# Patient Record
Sex: MALE | Race: WHITE | NOT HISPANIC OR LATINO | Employment: OTHER | URBAN - METROPOLITAN AREA
[De-identification: names, ages, dates, MRNs, and addresses within clinical notes are randomized per-mention and may not be internally consistent; named-entity substitution may affect disease eponyms.]

---

## 2017-03-14 ENCOUNTER — APPOINTMENT (OUTPATIENT)
Dept: CT IMAGING | Facility: HOSPITAL | Age: 62
End: 2017-03-14
Attending: EMERGENCY MEDICINE

## 2017-03-14 ENCOUNTER — APPOINTMENT (OUTPATIENT)
Dept: GENERAL RADIOLOGY | Facility: HOSPITAL | Age: 62
End: 2017-03-14

## 2017-03-14 ENCOUNTER — HOSPITAL ENCOUNTER (OUTPATIENT)
Facility: HOSPITAL | Age: 62
Discharge: HOME | End: 2017-03-15
Attending: EMERGENCY MEDICINE | Admitting: HOSPITALIST

## 2017-03-14 DIAGNOSIS — I16.1 HYPERTENSIVE EMERGENCY: ICD-10-CM

## 2017-03-14 DIAGNOSIS — I50.23 ACUTE ON CHRONIC SYSTOLIC CONGESTIVE HEART FAILURE (HCC): ICD-10-CM

## 2017-03-14 DIAGNOSIS — R10.12 LEFT UPPER QUADRANT PAIN: ICD-10-CM

## 2017-03-14 DIAGNOSIS — R07.9 CHEST PAIN, UNSPECIFIED TYPE: Primary | ICD-10-CM

## 2017-03-14 LAB
ALBUMIN SERPL-MCNC: 4 G/DL (ref 3.5–5.2)
ALBUMIN/GLOB SERPL: 1.1 G/DL
ALP SERPL-CCNC: 68 U/L (ref 40–129)
ALT SERPL W P-5'-P-CCNC: 17 U/L (ref 5–41)
ANION GAP SERPL CALCULATED.3IONS-SCNC: 13.2 MMOL/L
AST SERPL-CCNC: 14 U/L (ref 5–40)
BASOPHILS # BLD AUTO: 0.05 10*3/MM3 (ref 0–0.2)
BASOPHILS NFR BLD AUTO: 0.5 % (ref 0–2)
BILIRUB SERPL-MCNC: 0.4 MG/DL (ref 0.2–1.2)
BUN BLD-MCNC: 19 MG/DL (ref 8–23)
BUN/CREAT SERPL: 18.3 (ref 7–25)
CALCIUM SPEC-SCNC: 9.4 MG/DL (ref 8.8–10.5)
CHLORIDE SERPL-SCNC: 101 MMOL/L (ref 98–107)
CO2 SERPL-SCNC: 25.8 MMOL/L (ref 22–29)
CREAT BLD-MCNC: 1.04 MG/DL (ref 0.76–1.27)
D-LACTATE SERPL-SCNC: 1.1 MMOL/L (ref 0.5–2)
DEPRECATED RDW RBC AUTO: 53.8 FL (ref 37–54)
EOSINOPHIL # BLD AUTO: 0.1 10*3/MM3 (ref 0.1–0.3)
EOSINOPHIL NFR BLD AUTO: 1 % (ref 0–4)
ERYTHROCYTE [DISTWIDTH] IN BLOOD BY AUTOMATED COUNT: 17 % (ref 11.5–14.5)
GFR SERPL CREATININE-BSD FRML MDRD: 72 ML/MIN/1.73
GLOBULIN UR ELPH-MCNC: 3.6 GM/DL
GLUCOSE BLD-MCNC: 206 MG/DL (ref 65–99)
HCT VFR BLD AUTO: 41.7 % (ref 42–52)
HGB BLD-MCNC: 13.9 G/DL (ref 14–18)
IMM GRANULOCYTES # BLD: 0.03 10*3/MM3 (ref 0–0.03)
IMM GRANULOCYTES NFR BLD: 0.3 % (ref 0–0.5)
LIPASE SERPL-CCNC: 37 U/L (ref 13–60)
LYMPHOCYTES # BLD AUTO: 1.27 10*3/MM3 (ref 0.6–4.8)
LYMPHOCYTES NFR BLD AUTO: 12.1 % (ref 20–45)
MCH RBC QN AUTO: 28.8 PG (ref 27–31)
MCHC RBC AUTO-ENTMCNC: 33.3 G/DL (ref 31–37)
MCV RBC AUTO: 86.3 FL (ref 80–94)
MONOCYTES # BLD AUTO: 0.65 10*3/MM3 (ref 0–1)
MONOCYTES NFR BLD AUTO: 6.2 % (ref 3–8)
NEUTROPHILS # BLD AUTO: 8.36 10*3/MM3 (ref 1.5–8.3)
NEUTROPHILS NFR BLD AUTO: 79.9 % (ref 45–70)
NRBC BLD MANUAL-RTO: 0 /100 WBC (ref 0–0)
PLATELET # BLD AUTO: 237 10*3/MM3 (ref 140–500)
PMV BLD AUTO: 10.1 FL (ref 7.4–10.4)
POTASSIUM BLD-SCNC: 3.9 MMOL/L (ref 3.5–5.2)
PROT SERPL-MCNC: 7.6 G/DL (ref 6–8.5)
RBC # BLD AUTO: 4.83 10*6/MM3 (ref 4.7–6.1)
SODIUM BLD-SCNC: 140 MMOL/L (ref 136–145)
TROPONIN T SERPL-MCNC: <0.01 NG/ML (ref 0–0.03)
WBC NRBC COR # BLD: 10.46 10*3/MM3 (ref 4.8–10.8)

## 2017-03-14 PROCEDURE — 25010000002 HYDROMORPHONE PER 4 MG

## 2017-03-14 PROCEDURE — 96375 TX/PRO/DX INJ NEW DRUG ADDON: CPT

## 2017-03-14 PROCEDURE — 25010000002 HYDROMORPHONE PER 4 MG: Performed by: EMERGENCY MEDICINE

## 2017-03-14 PROCEDURE — 25010000002 ENOXAPARIN PER 10 MG: Performed by: HOSPITALIST

## 2017-03-14 PROCEDURE — 94660 CPAP INITIATION&MGMT: CPT

## 2017-03-14 PROCEDURE — 25010000002 ONDANSETRON PER 1 MG: Performed by: EMERGENCY MEDICINE

## 2017-03-14 PROCEDURE — 93005 ELECTROCARDIOGRAM TRACING: CPT | Performed by: EMERGENCY MEDICINE

## 2017-03-14 PROCEDURE — 25010000002 FUROSEMIDE PER 20 MG: Performed by: EMERGENCY MEDICINE

## 2017-03-14 PROCEDURE — 83690 ASSAY OF LIPASE: CPT | Performed by: EMERGENCY MEDICINE

## 2017-03-14 PROCEDURE — 96365 THER/PROPH/DIAG IV INF INIT: CPT

## 2017-03-14 PROCEDURE — 25010000002 FUROSEMIDE PER 20 MG: Performed by: INTERNAL MEDICINE

## 2017-03-14 PROCEDURE — 71010 HC CHEST PA OR AP: CPT

## 2017-03-14 PROCEDURE — 96376 TX/PRO/DX INJ SAME DRUG ADON: CPT

## 2017-03-14 PROCEDURE — 25010000002 MORPHINE SULFATE (PF) 2 MG/ML SOLUTION

## 2017-03-14 PROCEDURE — 84484 ASSAY OF TROPONIN QUANT: CPT | Performed by: HOSPITALIST

## 2017-03-14 PROCEDURE — 93010 ELECTROCARDIOGRAM REPORT: CPT | Performed by: INTERNAL MEDICINE

## 2017-03-14 PROCEDURE — 96361 HYDRATE IV INFUSION ADD-ON: CPT

## 2017-03-14 PROCEDURE — 93005 ELECTROCARDIOGRAM TRACING: CPT | Performed by: HOSPITALIST

## 2017-03-14 PROCEDURE — 80053 COMPREHEN METABOLIC PANEL: CPT | Performed by: EMERGENCY MEDICINE

## 2017-03-14 PROCEDURE — 83605 ASSAY OF LACTIC ACID: CPT | Performed by: EMERGENCY MEDICINE

## 2017-03-14 PROCEDURE — 96372 THER/PROPH/DIAG INJ SC/IM: CPT

## 2017-03-14 PROCEDURE — 99284 EMERGENCY DEPT VISIT MOD MDM: CPT | Performed by: EMERGENCY MEDICINE

## 2017-03-14 PROCEDURE — 85025 COMPLETE CBC W/AUTO DIFF WBC: CPT | Performed by: EMERGENCY MEDICINE

## 2017-03-14 PROCEDURE — 99284 EMERGENCY DEPT VISIT MOD MDM: CPT

## 2017-03-14 PROCEDURE — 99222 1ST HOSP IP/OBS MODERATE 55: CPT | Performed by: INTERNAL MEDICINE

## 2017-03-14 PROCEDURE — 94640 AIRWAY INHALATION TREATMENT: CPT

## 2017-03-14 PROCEDURE — 84484 ASSAY OF TROPONIN QUANT: CPT | Performed by: EMERGENCY MEDICINE

## 2017-03-14 PROCEDURE — 0 IOPAMIDOL PER 1 ML: Performed by: EMERGENCY MEDICINE

## 2017-03-14 PROCEDURE — 96374 THER/PROPH/DIAG INJ IV PUSH: CPT

## 2017-03-14 PROCEDURE — 74177 CT ABD & PELVIS W/CONTRAST: CPT

## 2017-03-14 RX ORDER — IPRATROPIUM BROMIDE AND ALBUTEROL SULFATE 2.5; .5 MG/3ML; MG/3ML
3 SOLUTION RESPIRATORY (INHALATION) EVERY 4 HOURS PRN
Status: DISCONTINUED | OUTPATIENT
Start: 2017-03-14 | End: 2017-03-15 | Stop reason: HOSPADM

## 2017-03-14 RX ORDER — MAGNESIUM SULFATE HEPTAHYDRATE 40 MG/ML
4 INJECTION, SOLUTION INTRAVENOUS ONCE
Status: COMPLETED | OUTPATIENT
Start: 2017-03-14 | End: 2017-03-14

## 2017-03-14 RX ORDER — SPIRONOLACTONE 25 MG/1
12.5 TABLET ORAL DAILY
Status: DISCONTINUED | OUTPATIENT
Start: 2017-03-14 | End: 2017-03-15 | Stop reason: HOSPADM

## 2017-03-14 RX ORDER — ISOSORBIDE MONONITRATE 60 MG/1
60 TABLET, EXTENDED RELEASE ORAL DAILY
Status: DISCONTINUED | OUTPATIENT
Start: 2017-03-14 | End: 2017-03-15 | Stop reason: HOSPADM

## 2017-03-14 RX ORDER — FUROSEMIDE 10 MG/ML
40 INJECTION INTRAMUSCULAR; INTRAVENOUS ONCE
Status: COMPLETED | OUTPATIENT
Start: 2017-03-14 | End: 2017-03-14

## 2017-03-14 RX ORDER — NITROGLYCERIN 0.4 MG/1
0.4 TABLET SUBLINGUAL
Status: DISCONTINUED | OUTPATIENT
Start: 2017-03-14 | End: 2017-03-15 | Stop reason: HOSPADM

## 2017-03-14 RX ORDER — ROPINIROLE 1 MG/1
4 TABLET, FILM COATED ORAL NIGHTLY
Status: DISCONTINUED | OUTPATIENT
Start: 2017-03-14 | End: 2017-03-15 | Stop reason: HOSPADM

## 2017-03-14 RX ORDER — NITROGLYCERIN 0.4 MG/1
0.4 TABLET SUBLINGUAL
Status: COMPLETED | OUTPATIENT
Start: 2017-03-14 | End: 2017-03-14

## 2017-03-14 RX ORDER — AMLODIPINE BESYLATE 5 MG/1
5 TABLET ORAL
Status: DISCONTINUED | OUTPATIENT
Start: 2017-03-14 | End: 2017-03-14

## 2017-03-14 RX ORDER — SODIUM CHLORIDE 0.9 % (FLUSH) 0.9 %
10 SYRINGE (ML) INJECTION AS NEEDED
Status: DISCONTINUED | OUTPATIENT
Start: 2017-03-14 | End: 2017-03-15 | Stop reason: HOSPADM

## 2017-03-14 RX ORDER — PANTOPRAZOLE SODIUM 40 MG/10ML
40 INJECTION, POWDER, LYOPHILIZED, FOR SOLUTION INTRAVENOUS EVERY 12 HOURS SCHEDULED
Status: DISCONTINUED | OUTPATIENT
Start: 2017-03-14 | End: 2017-03-15 | Stop reason: HOSPADM

## 2017-03-14 RX ORDER — METOPROLOL SUCCINATE 50 MG/1
50 TABLET, EXTENDED RELEASE ORAL DAILY
COMMUNITY
End: 2017-05-23

## 2017-03-14 RX ORDER — MORPHINE SULFATE 2 MG/ML
INJECTION, SOLUTION INTRAMUSCULAR; INTRAVENOUS
Status: COMPLETED
Start: 2017-03-14 | End: 2017-03-14

## 2017-03-14 RX ORDER — SODIUM CHLORIDE 0.9 % (FLUSH) 0.9 %
1-10 SYRINGE (ML) INJECTION AS NEEDED
Status: DISCONTINUED | OUTPATIENT
Start: 2017-03-14 | End: 2017-03-15 | Stop reason: HOSPADM

## 2017-03-14 RX ORDER — ONDANSETRON 2 MG/ML
4 INJECTION INTRAMUSCULAR; INTRAVENOUS ONCE
Status: COMPLETED | OUTPATIENT
Start: 2017-03-14 | End: 2017-03-14

## 2017-03-14 RX ORDER — FUROSEMIDE 40 MG/1
40 TABLET ORAL DAILY
Status: DISCONTINUED | OUTPATIENT
Start: 2017-03-15 | End: 2017-03-15 | Stop reason: HOSPADM

## 2017-03-14 RX ORDER — OXYCODONE HYDROCHLORIDE AND ACETAMINOPHEN 5; 325 MG/1; MG/1
1 TABLET ORAL ONCE
Status: COMPLETED | OUTPATIENT
Start: 2017-03-14 | End: 2017-03-14

## 2017-03-14 RX ORDER — GLIPIZIDE 10 MG/1
10 TABLET ORAL 2 TIMES DAILY
COMMUNITY
End: 2017-03-15 | Stop reason: HOSPADM

## 2017-03-14 RX ORDER — NITROGLYCERIN 20 MG/100ML
10-50 INJECTION INTRAVENOUS
Status: DISCONTINUED | OUTPATIENT
Start: 2017-03-14 | End: 2017-03-15 | Stop reason: HOSPADM

## 2017-03-14 RX ORDER — FUROSEMIDE 40 MG/1
40 TABLET ORAL DAILY
Status: DISCONTINUED | OUTPATIENT
Start: 2017-03-14 | End: 2017-03-14

## 2017-03-14 RX ORDER — BUDESONIDE AND FORMOTEROL FUMARATE DIHYDRATE 160; 4.5 UG/1; UG/1
2 AEROSOL RESPIRATORY (INHALATION)
COMMUNITY
End: 2017-03-15 | Stop reason: HOSPADM

## 2017-03-14 RX ORDER — NITROGLYCERIN 0.4 MG/1
0.4 TABLET SUBLINGUAL
COMMUNITY

## 2017-03-14 RX ORDER — AMLODIPINE BESYLATE 5 MG/1
5 TABLET ORAL
Status: DISCONTINUED | OUTPATIENT
Start: 2017-03-14 | End: 2017-03-15 | Stop reason: HOSPADM

## 2017-03-14 RX ORDER — ASPIRIN 325 MG
325 TABLET ORAL ONCE
Status: COMPLETED | OUTPATIENT
Start: 2017-03-14 | End: 2017-03-14

## 2017-03-14 RX ORDER — OXYCODONE HYDROCHLORIDE AND ACETAMINOPHEN 5; 325 MG/1; MG/1
TABLET ORAL
Status: DISPENSED
Start: 2017-03-14 | End: 2017-03-15

## 2017-03-14 RX ORDER — ALBUTEROL SULFATE 90 UG/1
2 AEROSOL, METERED RESPIRATORY (INHALATION) EVERY 4 HOURS PRN
COMMUNITY
End: 2017-03-15 | Stop reason: HOSPADM

## 2017-03-14 RX ORDER — BUDESONIDE AND FORMOTEROL FUMARATE DIHYDRATE 160; 4.5 UG/1; UG/1
2 AEROSOL RESPIRATORY (INHALATION)
Status: DISCONTINUED | OUTPATIENT
Start: 2017-03-14 | End: 2017-03-15 | Stop reason: HOSPADM

## 2017-03-14 RX ORDER — ALBUTEROL SULFATE 2.5 MG/3ML
2.5 SOLUTION RESPIRATORY (INHALATION) EVERY 4 HOURS PRN
Status: DISCONTINUED | OUTPATIENT
Start: 2017-03-14 | End: 2017-03-15 | Stop reason: HOSPADM

## 2017-03-14 RX ORDER — ISOSORBIDE MONONITRATE 60 MG/1
60 TABLET, EXTENDED RELEASE ORAL DAILY
COMMUNITY

## 2017-03-14 RX ORDER — MORPHINE SULFATE 2 MG/ML
2 INJECTION, SOLUTION INTRAMUSCULAR; INTRAVENOUS EVERY 6 HOURS PRN
Status: DISCONTINUED | OUTPATIENT
Start: 2017-03-14 | End: 2017-03-15 | Stop reason: HOSPADM

## 2017-03-14 RX ORDER — OXYCODONE AND ACETAMINOPHEN 10; 325 MG/1; MG/1
1 TABLET ORAL EVERY 4 HOURS PRN
Status: DISCONTINUED | OUTPATIENT
Start: 2017-03-14 | End: 2017-03-15 | Stop reason: HOSPADM

## 2017-03-14 RX ORDER — GLIPIZIDE 10 MG/1
10 TABLET ORAL 2 TIMES DAILY
Status: DISCONTINUED | OUTPATIENT
Start: 2017-03-14 | End: 2017-03-15 | Stop reason: HOSPADM

## 2017-03-14 RX ORDER — DILTIAZEM HYDROCHLORIDE 5 MG/ML
15 INJECTION INTRAVENOUS ONCE
Status: COMPLETED | OUTPATIENT
Start: 2017-03-14 | End: 2017-03-14

## 2017-03-14 RX ORDER — METOPROLOL SUCCINATE 50 MG/1
50 TABLET, EXTENDED RELEASE ORAL DAILY
Status: DISCONTINUED | OUTPATIENT
Start: 2017-03-14 | End: 2017-03-15 | Stop reason: HOSPADM

## 2017-03-14 RX ORDER — FUROSEMIDE 10 MG/ML
40 INJECTION INTRAMUSCULAR; INTRAVENOUS 2 TIMES DAILY
Status: DISCONTINUED | OUTPATIENT
Start: 2017-03-14 | End: 2017-03-14

## 2017-03-14 RX ORDER — ASPIRIN 81 MG/1
81 TABLET ORAL ONCE
Status: COMPLETED | OUTPATIENT
Start: 2017-03-14 | End: 2017-03-14

## 2017-03-14 RX ADMIN — NITROGLYCERIN 0.4 MG: 0.4 TABLET SUBLINGUAL at 11:38

## 2017-03-14 RX ADMIN — METOPROLOL SUCCINATE 50 MG: 50 TABLET, EXTENDED RELEASE ORAL at 17:52

## 2017-03-14 RX ADMIN — ENOXAPARIN SODIUM 40 MG: 40 INJECTION SUBCUTANEOUS at 17:52

## 2017-03-14 RX ADMIN — SPIRONOLACTONE 12.5 MG: 25 TABLET ORAL at 17:55

## 2017-03-14 RX ADMIN — NITROGLYCERIN 0.4 MG: 0.4 TABLET SUBLINGUAL at 11:47

## 2017-03-14 RX ADMIN — BUDESONIDE AND FORMOTEROL FUMARATE DIHYDRATE 2 PUFF: 160; 4.5 AEROSOL RESPIRATORY (INHALATION) at 19:44

## 2017-03-14 RX ADMIN — MORPHINE SULFATE 2 MG: 2 INJECTION, SOLUTION INTRAMUSCULAR; INTRAVENOUS at 22:23

## 2017-03-14 RX ADMIN — ASPIRIN 81 MG: 81 TABLET, COATED ORAL at 17:52

## 2017-03-14 RX ADMIN — HYDROMORPHONE HYDROCHLORIDE 0.5 MG: 1 INJECTION, SOLUTION INTRAMUSCULAR; INTRAVENOUS; SUBCUTANEOUS at 23:27

## 2017-03-14 RX ADMIN — MAGNESIUM SULFATE HEPTAHYDRATE 4 G: 40 INJECTION, SOLUTION INTRAVENOUS at 18:40

## 2017-03-14 RX ADMIN — OXYCODONE HYDROCHLORIDE AND ACETAMINOPHEN 1 TABLET: 5; 325 TABLET ORAL at 14:19

## 2017-03-14 RX ADMIN — ROPINIROLE HYDROCHLORIDE 4 MG: 1 TABLET, FILM COATED ORAL at 20:05

## 2017-03-14 RX ADMIN — SODIUM CHLORIDE 1000 ML: 9 INJECTION, SOLUTION INTRAVENOUS at 09:40

## 2017-03-14 RX ADMIN — OXYCODONE HYDROCHLORIDE AND ACETAMINOPHEN 1 TABLET: 10; 325 TABLET ORAL at 22:03

## 2017-03-14 RX ADMIN — ASPIRIN 325 MG: 325 TABLET, COATED ORAL at 11:31

## 2017-03-14 RX ADMIN — ISOSORBIDE MONONITRATE 60 MG: 60 TABLET, EXTENDED RELEASE ORAL at 17:52

## 2017-03-14 RX ADMIN — AMLODIPINE BESYLATE 5 MG: 5 TABLET ORAL at 18:40

## 2017-03-14 RX ADMIN — NITROGLYCERIN 0.4 MG: 0.4 TABLET SUBLINGUAL at 11:32

## 2017-03-14 RX ADMIN — OXYCODONE HYDROCHLORIDE AND ACETAMINOPHEN 1 TABLET: 10; 325 TABLET ORAL at 17:52

## 2017-03-14 RX ADMIN — IOPAMIDOL 100 ML: 755 INJECTION, SOLUTION INTRAVENOUS at 10:51

## 2017-03-14 RX ADMIN — ONDANSETRON 4 MG: 2 INJECTION, SOLUTION INTRAMUSCULAR; INTRAVENOUS at 09:39

## 2017-03-14 RX ADMIN — GLIPIZIDE 10 MG: 10 TABLET ORAL at 17:52

## 2017-03-14 RX ADMIN — OXYCODONE AND ACETAMINOPHEN 1 TABLET: 5; 325 TABLET ORAL at 13:47

## 2017-03-14 RX ADMIN — FUROSEMIDE 40 MG: 10 INJECTION, SOLUTION INTRAMUSCULAR; INTRAVENOUS at 18:32

## 2017-03-14 RX ADMIN — PANTOPRAZOLE SODIUM 40 MG: 40 INJECTION, POWDER, FOR SOLUTION INTRAVENOUS at 22:23

## 2017-03-14 RX ADMIN — HYDROMORPHONE HYDROCHLORIDE 1 MG: 1 INJECTION, SOLUTION INTRAMUSCULAR; INTRAVENOUS; SUBCUTANEOUS at 09:39

## 2017-03-14 RX ADMIN — DILTIAZEM HYDROCHLORIDE 15 MG: 5 INJECTION INTRAVENOUS at 11:21

## 2017-03-14 RX ADMIN — FUROSEMIDE 40 MG: 40 TABLET ORAL at 17:52

## 2017-03-14 RX ADMIN — FUROSEMIDE 40 MG: 10 INJECTION, SOLUTION INTRAMUSCULAR; INTRAVENOUS at 12:05

## 2017-03-14 NOTE — ED NOTES
"Pt tolerating BIPAP well.  Pt sts \"I wish I had one of these at home.\"     Yoselyn Peoples RN  03/14/17 1210    "

## 2017-03-14 NOTE — ED NOTES
Spoke with dyana NARANJO at [facility] she would page dr lucas.     Srinivasan Baron  03/14/17 4598

## 2017-03-14 NOTE — NURSING NOTE
Pt insisted on walking in the [place], seemed agitated and irritable.  Observed pt to ambulate with steady gate.   Arranged for tele box and asked pt to stay in view of nurses station.  Pt agreed, reports no pain at this time apart from his chronic back pain pt reports to be exacerbated by lying in bed.

## 2017-03-14 NOTE — ED NOTES
Dr Harper at bedside to eval pt.  Aware of lung sounds, diaphoresis.       Yoselyn Peoples, OSMANY  03/14/17 4310

## 2017-03-14 NOTE — ED NOTES
Pt still upset that he has not rec'd 10mg percocet.  Pt does not want to stay if he doesn't get his 10mg percocet.  Dr Harper aware.  Another 5mg ordered and given.     Yoselyn Peoples RN  03/14/17 9839

## 2017-03-14 NOTE — H&P
Hospitalist Team      Patient Care Team:  SERGEY Cornejo as PCP - General (Family Medicine)    CHIEF COMPLAINT: CP/SOA  HISTORY OF PRESENT ILLNESS:  Per ED ote:  MDM  62 yo M presents with 2 hours of epigastric and LUQ abdominal pain, hx of pancreatitis and reports this pain is similar. IV established, given IV dilaudid and zofran, NS bolus. Labs including lipase, lactate obtained. CT abdomen w/ IV contrast ordered. Initial labs reassuring, with normal lipase, normal troponin. He had initial symptom improvement, however later complained of chest pain, became very diaphoretic and hypertensive, and dyspneic, with crackles noted at lung bases bilaterally. He was given nitro SL with reduction of his BP, placed on BIPAP due to concern flash pulmonary edema, and given lasix 40 IV, . Serial EKGs obtained. He has baseline LBBB with rate dependent lateral ST depressions, no ST elevation or Sgarbossa criteria for STEMI. His pain resolved with BP reduction, and work of breathing improved greatly, trialing off BIPAP at this time. Will require admission for serial troponins, monitoring of blood pressure, diuresis. Later review of his chart reveals EF 25% on prior ECHO.    He is accepted to the ICU by Dr. Hill after 3 hour troponin returned normal.       On my interview pt reaffirms the above. Admitted to ICU doing better CP free. Off bipap on 2L NC, eating at bedside. No wheezes, no f/c/s/n/v/d. abd pain improved.           Past Medical History   Diagnosis Date   • Arthritis    • Chest pain    • COPD (chronic obstructive pulmonary disease)    • Diabetes    • Heart attack    • Heart disease    • HTN (hypertension)    • Hyperlipidemia    • Hypertension    • Pancreatitis    • Sleep apnea      Past Surgical History   Procedure Laterality Date   • Coronary artery bypass graft     • Coronary angioplasty with stent placement     • Incision and drainage abscess     • Head/neck abscess incision and drainage N/A  3/17/2016     Procedure: HEAD NECK ABSCESS INCISION AND DRAINAGE-POSTERIOR NECK with cultures;  Surgeon: Liz Hernandez DO;  Location: [facility] OR;  Service:      Family History   Problem Relation Age of Onset   • Diabetes Mother    • Heart disease Mother      Social History   Substance Use Topics   • Smoking status: Current Every Day Smoker     Packs/day: 1.00     Years: 50.00   • Smokeless tobacco: None   • Alcohol use No     Prescriptions Prior to Admission   Medication Sig Dispense Refill Last Dose   • albuterol (PROVENTIL HFA) 108 (90 BASE) MCG/ACT inhaler Inhale 2 puffs Every 4 (Four) Hours As Needed for Wheezing.   3/13/2017 at Unknown time   • albuterol (PROVENTIL) (2.5 MG/3ML) 0.083% nebulizer solution Take 2.5 mg by nebulization every 4 (four) hours as needed for wheezing. 25 vial 0 3/13/2017 at Unknown time   • aspirin 81 MG tablet Take 81 mg by mouth Daily.   3/13/2017 at Unknown time   • aspirin 81 MG tablet Take 81 mg by mouth Daily.   3/13/2017 at Unknown time   • budesonide-formoterol (SYMBICORT) 160-4.5 MCG/ACT inhaler Symbicort 160-4.5 MCG/ACT Inhalation Aerosol; Patient Sig: Symbicort 160-4.5 MCG/ACT Inhalation Aerosol ; 0; 08-Apr-2015; Active   3/13/2017 at Unknown time   • budesonide-formoterol (SYMBICORT) 160-4.5 MCG/ACT inhaler Inhale 2 puffs 2 (Two) Times a Day. PRN wheezing   3/13/2017 at Unknown time   • furosemide (LASIX) 40 MG tablet Take 40 mg by mouth Daily.   3/13/2017 at Unknown time   • glipiZIDE (GLUCOTROL) 10 MG tablet Take 10 mg by mouth 2 (Two) Times a Day Before Meals.   3/13/2017 at Unknown time   • glipiZIDE (GLUCOTROL) 10 MG tablet Take 10 mg by mouth 2 (Two) Times a Day.   3/13/2017 at Unknown time   • ibuprofen (ADVIL,MOTRIN) 800 MG tablet Take 1 tablet by mouth every 8 (eight) hours as needed for moderate pain (4-6) for up to 60 doses. 60 tablet 2 Past Week at Unknown time   • isosorbide mononitrate (IMDUR) 60 MG 24 hr tablet Take 60 mg by mouth Daily.   3/13/2017 at  Unknown time   • metFORMIN (GLUCOPHAGE) 1000 MG tablet Take 1,000 mg by mouth 2 (Two) Times a Day With Meals.   3/13/2017 at Unknown time   • metFORMIN (GLUCOPHAGE) 1000 MG tablet Take 1,000 mg by mouth 2 (Two) Times a Day.   3/13/2017 at Unknown time   • metoprolol succinate XL (TOPROL-XL) 50 MG 24 hr tablet Take 50 mg by mouth Daily.   3/13/2017 at Unknown time   • nitroglycerin (NITROSTAT) 0.4 MG SL tablet Place 0.4 mg under the tongue Every 5 (Five) Minutes As Needed for chest pain. Take no more than 3 doses in 15 minutes.   3/13/2017 at Unknown time   • oxyCODONE-acetaminophen (PERCOCET)  MG per tablet Take 1 tablet by mouth Every 4 (Four) Hours As Needed for Moderate Pain (4-6).   3/14/2017 at Unknown time   • rOPINIRole (REQUIP) 2 MG tablet Take 4 mg by mouth Every Night.   3/13/2017 at Unknown time   • Sacubitril-Valsartan (ENTRESTO PO) Take  by mouth 2 (Two) Times a Day. Pt reports he receives his med in sample form from his cardiologist and does not know the dosage but takes twice a day.   3/13/2017 at Unknown time   • spironolactone (ALDACTONE) 25 MG tablet Take 12.5 mg by mouth Daily.   3/13/2017 at Unknown time   • albuterol (PROVENTIL HFA;VENTOLIN HFA) 108 (90 BASE) MCG/ACT inhaler Ventolin  (90 Base) MCG/ACT Inhalation Aerosol Solution; Patient Sig: Ventolin  (90 Base) MCG/ACT Inhalation Aerosol Solution ; 0; 08-Apr-2015; Active   3/16/2016 at 1000   • gabapentin (NEURONTIN) 600 MG tablet Take 600 mg by mouth 3 (three) times a day.   3/16/2016 at 1000   • MethylPREDNISolone (MEDROL) 4 MG tablet follow package directions 21 tablet 0    • nitroglycerin (NITROSTAT) 0.4 MG SL tablet Place  under the tongue.   Unknown at Unknown time   • potassium chloride (MICRO-K) 10 MEQ CR capsule Take  by mouth.   3/16/2016 at 1000   • prasugrel (EFFIENT) tablet Take  by mouth daily.   3/16/2016 at 1000     Allergies:  Review of patient's allergies indicates no known allergies.    REVIEW OF SYSTEMS:  " Please see the above history of present illness for pertinent positives and negatives.  The remainder of the patient's systems have been reviewed and are negative x14 systems.    Vital Signs  Temp:  [98.2 °F (36.8 °C)] 98.2 °F (36.8 °C)  Heart Rate:  [] 82  Resp:  [12-28] 12  BP: (110-212)/() 168/97    Flowsheet Rows         First Filed Value    Admission Height  68\" (172.7 cm) Documented at 03/14/2017 0927    Admission Weight  177 lb 7 oz (80.5 kg) Documented at 03/14/2017 0927           Physical Exam:  Physical Exam   Constitutional: Patient appears well-developed and well-nourished and in no acute distress, eating at bedside   HEENT:   Head: Normocephalic and atraumatic.   Eyes:  Pupils are equal, round, and reactive to light. EOM are intact. Sclera are anicteric and non-injected.  Mouth and Throat: Patient has moist mucous membranes. Oropharynx is clear of any erythema or exudate.     Neck: Neck supple. + JVD present. No thyromegaly present. No lymphadenopathy present.  Cardiovascular: Regular rate, regular rhythm, S1 normal and S2 normal.  2/6 LADONNA. Exam reveals no gallop and no friction rub.   Pulmonary/Chest: Lungs are with L>R crackles basally No respiratory distress. No wheezes. No rhonchi..   Abdominal: Soft. Bowel sounds are normal. No distension and no mass. There is no hepatosplenomegaly. There is no tenderness.   Musculoskeletal: Normal Muscle tone  Extremities: trace edema. Pulses are palpable in all 4 extremities.  Neurological: Patient is alert and oriented to person, place, and time. Cranial nerves II-XII are grossly intact with no focal deficits.  Skin: Skin is warm. No rash noted. Nails show no clubbing.  No cyanosis or erythema.     Results Review:    I reviewed the patient's new clinical results.  Lab Results (most recent)     Procedure Component Value Units Date/Time    CBC & Differential [[id number]] Collected:  03/14/17 0933    Specimen:  Blood Updated:  03/14/17 0954    Narrative: "       The following orders were created for panel order CBC & Differential.  Procedure                               Abnormality         Status                     ---------                               -----------         ------                     CBC Auto Differential[[id number]]         Abnormal            Final result                 Please view results for these tests on the individual orders.    CBC Auto Differential [[id number]]  (Abnormal) Collected:  03/14/17 0933    Specimen:  Blood Updated:  03/14/17 0954     WBC 10.46 10*3/mm3      RBC 4.83 10*6/mm3      Hemoglobin 13.9 (L) g/dL      Hematocrit 41.7 (L) %      MCV 86.3 fL      MCH 28.8 pg      MCHC 33.3 g/dL      RDW 17.0 (H) %      RDW-SD 53.8 fl      MPV 10.1 fL      Platelets 237 10*3/mm3      Neutrophil % 79.9 (H) %      Lymphocyte % 12.1 (L) %      Monocyte % 6.2 %      Eosinophil % 1.0 %      Basophil % 0.5 %      Immature Grans % 0.3 %      Neutrophils, Absolute 8.36 (H) 10*3/mm3      Lymphocytes, Absolute 1.27 10*3/mm3      Monocytes, Absolute 0.65 10*3/mm3      Eosinophils, Absolute 0.10 10*3/mm3      Basophils, Absolute 0.05 10*3/mm3      Immature Grans, Absolute 0.03 10*3/mm3      nRBC 0.0 /100 WBC     Lactic Acid, Plasma [[id number]]  (Normal) Collected:  03/14/17 0933    Specimen:  Blood Updated:  03/14/17 1007     Lactate 1.1 mmol/L     Lipase [[id number]]  (Normal) Collected:  03/14/17 0933    Specimen:  Blood Updated:  03/14/17 1007     Lipase 37 U/L     Comprehensive Metabolic Panel [[id number]]  (Abnormal) Collected:  03/14/17 0933    Specimen:  Blood Updated:  03/14/17 1013     Glucose 206 (H) mg/dL      BUN 19 mg/dL      Creatinine 1.04 mg/dL      Sodium 140 mmol/L      Potassium 3.9 mmol/L      Chloride 101 mmol/L      CO2 25.8 mmol/L      Calcium 9.4 mg/dL      Total Protein 7.6 g/dL      Albumin 4.00 g/dL      ALT (SGPT) 17 U/L      AST (SGOT) 14 U/L       Specimen hemolyzed.  Results may be affected.        Alkaline Phosphatase 68 U/L       Total Bilirubin 0.4 mg/dL      eGFR Non African Amer 72 mL/min/1.73      Globulin 3.6 gm/dL      A/G Ratio 1.1 g/dL      BUN/Creatinine Ratio 18.3      Anion Gap 13.2 mmol/L     Troponin [[id number]]  (Normal) Collected:  03/14/17 0933    Specimen:  Blood Updated:  03/14/17 1137     Troponin T <0.010 ng/mL     Narrative:       Troponin T Reference Ranges:  Less than 0.03 ng/mL:    Negative for AMI  0.03 to 0.09 ng/mL:      Indeterminant for AMI  Greater than 0.09 ng/mL: Positive for AMI    Troponin [[id number]]  (Normal) Collected:  03/14/17 1227    Specimen:  Blood from Hand, Left Updated:  03/14/17 1313     Troponin T <0.010 ng/mL     Narrative:       Troponin T Reference Ranges:  Less than 0.03 ng/mL:    Negative for AMI  0.03 to 0.09 ng/mL:      Indeterminant for AMI  Greater than 0.09 ng/mL: Positive for AMI          Imaging Results (most recent)     Procedure Component Value Units Date/Time    CT Abdomen Pelvis With Contrast [[id number]] Collected:  03/14/17 1104     Updated:  03/14/17 1113    Narrative:       CT ABDOMEN AND PELVIS WITH CONTRAST, 03/14/2017:     HISTORY:  60-year-old male in the ED complaining of epigastric pain and left upper  quadrant abdomen pain with nausea beginning earlier this morning. The  patient notes a history of intermittent pancreatitis over the prior five  years. Lipase levels are currently normal.     TECHNIQUE:  CT examination of the abdomen and pelvis with IV contrast. GI contrast  was not ordered. Radiation dose reduction techniques were utilized,  including automated exposure control and exposure modulation based on  body size.     LOWER CHEST FINDINGS:  Lung base images show mild interstitial edema and small bilateral  pleural effusions suggesting vascular congestion. Clinical correlation  recommended. No pericardial effusion.     ABDOMEN FINDINGS:  Diffuse chronic calcific pancreatitis, but no evidence of acute  pancreatitis at this time. Nondistended gallbladder. No bile duct  or  pancreatic duct dilatation.  Liver and spleen are normal in size and appearance.     There is a small subacute or chronic infarct in the lower pole left  kidney, not present on a CTA examination of 08/09/2013. Nonobstructing 7  mm left mid renal calculus. Small right lower renal cyst. No evidence of  urinary obstruction. Normal caliber abdominal aorta.     Mild sigmoid diverticulosis. Small bowel and colon are otherwise normal  in caliber and appearance, as imaged. The appendix is normal.     PELVIS FINDINGS:  Urinary bladder, prostate and rectum are within normal limits.       Impression:       1. Lung base images showing vascular congestion with mild interstitial  edema and small bilateral pleural effusions.  2. Small subacute or chronic left lower pole renal infarct.  3. Chronic calcific pancreatitis. No CT evidence of acute pancreatitis  at this time.  4. Mild diverticulosis. Normal appendix.  5. Nonobstructing left mid renal calculus.     This report was finalized on 3/14/2017 11:11 AM by Dr. Andrade Rodriguez MD.       XR Chest 1 View [[id number]] Collected:  03/14/17 1224     Updated:  03/14/17 1228    Narrative:       CHEST X-RAY, 03/14/2017:     HISTORY:   61-year-old male in the ED complaining of read a history of chest pain  and shortness of air.     TECHNIQUE:  AP portable upright chest x-ray.     FINDINGS:  Mild cardiomegaly is stable. Pulmonary venous redistribution, mild  diffuse interstitial edema and tiny bilateral pleural effusions  suggesting mild vascular congestion. This is similar when compared with  the previous study of 07/14/2016. Prior CABG. Cardiac pacer/ICD.       Impression:       1. Mild vascular congestion with diffuse interstitial edema and tiny  pleural effusions. Similar findings were present on 07/14/2016.  2. Stable mild cardiomegaly. CABG.     This report was finalized on 3/14/2017 12:26 PM by Dr. Andrade Rodriguez MD.           reviewed    ECG/EMG Results (most recent)      Procedure Component Value Units Date/Time    ECG 12 Lead [[id number]] Collected:  03/14/17 1114     Updated:  03/14/17 1425    Narrative:       RR Interval= 619 ms  LA Interval= ms  QRSD Interval= 172 ms  QT Interval= ms  QTc Interval= 0 ms  Heart Rate= 97 ms  P Axis= deg  QRS Axis= 79 deg  T Wave Axis= deg  I: 40 Axis= 87 deg  T: 40 Axis= 145 deg  ST Axis= deg  VENTRICULAR PREMATURE COMPLEX  NONSPECIFIC INTRAVENTRICULAR CONDUCTION DELAY  PROBABLE LVH WITH SECONDARY REPOL ABNRM  SINUS RHYTHM  NO SIGNIFICANT CHANGE FROM PREVIOUS ECG  Electronically Signed by:  Kathryn Gonzales ([facility]) 14-Mar-2017 14:23:05  Date and Time of Study: 2017-03-14 11:14:12    ECG 12 Lead [[id number]] Collected:  03/14/17 0945     Updated:  03/14/17 1426    Narrative:       RR Interval= 571 ms  LA Interval= 148 ms  QRSD Interval= 144 ms  QT Interval= 384 ms  QTc Interval= 508 ms  Heart Rate= 105 ms  P Axis= 52 deg  QRS Axis= 63 deg  T Wave Axis= 221 deg  I: 40 Axis= 92 deg  T: 40 Axis= deg  ST Axis= 212 deg  SINUS TACHYCARDIA  VENTRICULAR PREMATURE COMPLEX  PROBABLE LEFT ATRIAL ABNORMALITY  LEFT BUNDLE BRANCH BLOCK  NO SIGNIFICANT CHANGE FROM PREVIOUS ECG  Electronically Signed by:  Kathryn Gonzales ([facility]) 14-Mar-2017 14:22:33  Date and Time of Study: 2017-03-14 09:45:55    ECG 12 Lead [[id number]] Collected:  03/14/17 1143     Updated:  03/14/17 1427    Narrative:       RR Interval= 496 ms  LA Interval= ms  QRSD Interval= 148 ms  QT Interval= 384 ms  QTc Interval= 545 ms  Heart Rate= 121 ms  P Axis= 0 deg  QRS Axis= 62 deg  T Wave Axis= 208 deg  I: 40 Axis= 85 deg  T: 40 Axis= 70 deg  ST Axis= 179 deg  SINUS TACHYCARDIA  LEFT BUNDLE BRANCH BLOCK  NO SIGNIFICANT CHANGE FROM PREVIOUS ECG  Electronically Signed by:  Kathryn Gonzales ([facility]) 14-Mar-2017 14:23:16  Date and Time of Study: 2017-03-14 11:43:02        reviewed    Assessment/Plan   1. CP: likely cardiac cp 2/2 severe HTN with flash pulm edema, completely resolved with BP reduction and  SOA/hypoxia greatly improved with diuresis. No ICD shocks but no way to interrogate here now.  - another IV dose lasix tonight then change dose back to PO home dose,   - cont metop XL daily,   - on entresto at home so will have pt family bring that here for him, no ACEi/ARB can be given as contraindicated within 36 hours of entresto.  - cont statin  - wean O2, ambulate  - echo ordered and pending  - consider amlodipine if BP not controlled although no mortality benefit in ICM.   - cont ISMN 60 daily  - cont aldactone for his CM  - trend trops, no indication currently for systemic AC.  - repeat EKG in AM, PRN CP  - pt reports cardiologist Dr. Roberts, [facility] a few weeks ago aith reported no options for further revascularization. EF 25%   - ICM wide complex -170ms, could meet criteria for CRT, cont medical therapy for now. Pt may discuss with cardiology as outpt. Had single chamber ICD curently  - IV labetalol PRN HTN recurrence, cannot use IV vasotec with entresto. Added amlodipine during hospitalization    2. SOA: off bipiap, diuresis as above, add bronchodilators      3. abd pain: IVF, resolved/improved, eating currently without abd pain, CTM    4. DM: SSI and home hypoglycemics    5. HTN: as above    6: HLD: statin    7. BETTINA: bipap at night as needed.    8. PPI/Lovenox/SCDs, IS Q1h.    I discussed the patients findings and my recommendations with patient and staff.     Contreras Rodriguez MD  03/14/17  5:54 PM    Time: 40 min

## 2017-03-14 NOTE — ED PROVIDER NOTES
Subjective   History of Present Illness  60 yo M w/ hx of CAD s/p CABG, HTN, HL, pancreatitis, presents today with epigastric and left upper abdominal pain for about 2 hours, nausea but no vomiting.  Pain is non radiating.  It is similar to prior episodes of pancreatitis.  No chest pain or SOB.  No fever or chills.   Review of Systems  A 10 system review of systems is negative other than stated in the HPI above.   Past Medical History   Diagnosis Date   • Arthritis    • Chest pain    • COPD (chronic obstructive pulmonary disease)    • Diabetes    • Heart attack    • Heart disease    • HTN (hypertension)    • Hyperlipidemia    • Hypertension    • Pancreatitis    • Sleep apnea        No Known Allergies    Past Surgical History   Procedure Laterality Date   • Coronary artery bypass graft     • Coronary angioplasty with stent placement     • Incision and drainage abscess     • Head/neck abscess incision and drainage N/A 3/17/2016     Procedure: HEAD NECK ABSCESS INCISION AND DRAINAGE-POSTERIOR NECK with cultures;  Surgeon: Liz Hernandez DO;  Location: [facility] OR;  Service:        Family History   Problem Relation Age of Onset   • Diabetes Mother    • Heart disease Mother        Social History     Social History   • Marital status:      Spouse name: N/A   • Number of children: N/A   • Years of education: N/A     Social History Main Topics   • Smoking status: Current Every Day Smoker     Packs/day: 1.00     Years: 50.00   • Smokeless tobacco: None   • Alcohol use No   • Drug use: No   • Sexual activity: Defer     Other Topics Concern   • None     Social History Narrative           Objective   Physical Exam  GENERAL: Awake, alert, moaning, appears uncomfortable.   HEENT:  Normocephalic, atraumatic. Conjunctiva clear. Sclerae anicteric. Mucous membranes moist. Nares patent. Pupils equal, reactive. Extra-ocular movements normal.   NECK: No tenderness. Full range of motion. No adenopathy. No thyromegaly.    RESPIRATORY: Lungs clear to auscultation bilaterally. Good air movement.   CARDIOVASCULAR: Regular rhythm, normal rate. No murmur. No chest wall tenderness.   ABDOMEN:  Moderately tender epigastric and LUQ, no rebound or guarding.  No pulsatile abdominal mass. Abdomen nondistended. Normal bowel sounds. No flank tenderness.   RECTAL: deferred  NEUROLOGIC: Cranial nerves II-XII normal. Motor strength 5/5 in extremities. Sensation intact distally. Gait normal. mental status normal.   EXTREMITIES: No pedal edema. 2+ pedal pulses bilaterally. No deformity.   SKIN:  no rash, normal to inspection.   Procedures         ED Course  ED Course                  MDM  60 yo M presents with 2 hours of epigastric and LUQ abdominal pain, hx of pancreatitis and reports this pain is similar.  IV established, given IV dilaudid and zofran, NS bolus.  Labs including lipase, lactate obtained.  CT abdomen w/ IV contrast ordered. Initial labs reassuring, with normal lipase, normal troponin.  He had initial symptom improvement, however later complained of chest pain, became very diaphoretic and hypertensive, and dyspneic, with crackles noted at lung bases bilaterally.  He was given nitro SL with reduction of his BP, placed on BIPAP due to concern flash pulmonary edema, and given lasix 40 IV, .  Serial EKGs obtained.  He has baseline LBBB with rate dependent lateral ST depressions, no ST elevation or Sgarbossa criteria for STEMI.  His pain resolved with BP reduction, and work of breathing improved greatly, trialing off BIPAP at this time.  Will require admission for serial troponins, monitoring of blood pressure, diuresis.  Later review of his chart reveals EF 25% on prior ECHO.      He is accepted to the ICU by Dr. Hill after 3 hour troponin returned normal.       Final diagnoses:   Chest pain, unspecified type   Hypertensive emergency   Acute on chronic systolic congestive heart failure   Left upper quadrant pain             Justin Harper MD  03/14/17 7891

## 2017-03-14 NOTE — NURSING NOTE
Pt using smokeless tobacco currently in hospital.  He said he has a cardiologist: Dr Roberts in [place]

## 2017-03-14 NOTE — PLAN OF CARE
Problem: Patient Care Overview (Adult)  Goal: Adult Individualization and Mutuality  Outcome: Ongoing (interventions implemented as appropriate)    03/14/17 1924   Individualization   Patient Specific Preferences seems to prefer to be as independent as possible, wants to walk around         Problem: Pain, Acute (Adult)  Goal: Identify Related Risk Factors and Signs and Symptoms  Outcome: Ongoing (interventions implemented as appropriate)  Goal: Acceptable Pain Control/Comfort Level  Outcome: Ongoing (interventions implemented as appropriate)    Problem: Pain, Chronic (Adult)  Goal: Identify Related Risk Factors and Signs and Symptoms  Outcome: Ongoing (interventions implemented as appropriate)  Goal: Acceptable Pain Control/Comfort Level  Outcome: Ongoing (interventions implemented as appropriate)    Problem: Cardiac Output, Decreased (Adult)  Goal: Identify Related Risk Factors and Signs and Symptoms  Outcome: Ongoing (interventions implemented as appropriate)  Goal: Adequate Cardiac Output/Effective Tissue Perfusion  Outcome: Ongoing (interventions implemented as appropriate)

## 2017-03-14 NOTE — ED NOTES
Will hold Nitroglycerin gtt for now due to improvement in BP, per Dr Harper.       Yoselyn Peoples, OSMANY  03/14/17 1048

## 2017-03-14 NOTE — ED NOTES
Pt not happy that he is only getting 5mg of Percocet and not 10mg.  Pt took the 5mg tablet but wants to talk to the doctor.      Yoselyn Peoples RN  03/14/17 4043

## 2017-03-15 ENCOUNTER — APPOINTMENT (OUTPATIENT)
Dept: ULTRASOUND IMAGING | Facility: HOSPITAL | Age: 62
End: 2017-03-15

## 2017-03-15 ENCOUNTER — APPOINTMENT (OUTPATIENT)
Dept: CARDIOLOGY | Facility: HOSPITAL | Age: 62
End: 2017-03-15
Attending: HOSPITALIST

## 2017-03-15 VITALS
HEIGHT: 68 IN | OXYGEN SATURATION: 96 % | HEART RATE: 65 BPM | DIASTOLIC BLOOD PRESSURE: 76 MMHG | TEMPERATURE: 97.2 F | RESPIRATION RATE: 18 BRPM | WEIGHT: 168.44 LBS | BODY MASS INDEX: 25.53 KG/M2 | SYSTOLIC BLOOD PRESSURE: 126 MMHG

## 2017-03-15 PROBLEM — R06.02 SOB (SHORTNESS OF BREATH): Status: ACTIVE | Noted: 2017-03-15

## 2017-03-15 PROBLEM — R10.9 ABDOMINAL PAIN: Status: ACTIVE | Noted: 2017-03-15

## 2017-03-15 LAB
ANION GAP SERPL CALCULATED.3IONS-SCNC: 10.4 MMOL/L
AORTIC ARCH: 2.7 CM
ASCENDING AORTA: 3.7 CM
BASOPHILS # BLD AUTO: 0.04 10*3/MM3 (ref 0–0.2)
BASOPHILS NFR BLD AUTO: 0.5 % (ref 0–2)
BH CV ECHO MEAS - ACS: 2.1 CM
BH CV ECHO MEAS - AO MAX PG (FULL): 3.2 MMHG
BH CV ECHO MEAS - AO MAX PG: 7.7 MMHG
BH CV ECHO MEAS - AO MEAN PG (FULL): 2.3 MMHG
BH CV ECHO MEAS - AO MEAN PG: 4.6 MMHG
BH CV ECHO MEAS - AO ROOT AREA (BSA CORRECTED): 1.8
BH CV ECHO MEAS - AO ROOT AREA: 9.4 CM^2
BH CV ECHO MEAS - AO ROOT DIAM: 3.5 CM
BH CV ECHO MEAS - AO V2 MAX: 139 CM/SEC
BH CV ECHO MEAS - AO V2 MEAN: 101.2 CM/SEC
BH CV ECHO MEAS - AO V2 VTI: 27.4 CM
BH CV ECHO MEAS - ASC AORTA: 3.7 CM
BH CV ECHO MEAS - AVA(I,A): 2.5 CM^2
BH CV ECHO MEAS - AVA(I,D): 2.5 CM^2
BH CV ECHO MEAS - AVA(V,A): 2.6 CM^2
BH CV ECHO MEAS - AVA(V,D): 2.6 CM^2
BH CV ECHO MEAS - BSA(HAYCOCK): 1.9 M^2
BH CV ECHO MEAS - BSA: 1.9 M^2
BH CV ECHO MEAS - BZI_BMI: 25.5 KILOGRAMS/M^2
BH CV ECHO MEAS - BZI_METRIC_HEIGHT: 172.7 CM
BH CV ECHO MEAS - BZI_METRIC_WEIGHT: 76.2 KG
BH CV ECHO MEAS - CONTRAST EF (2CH): 37.9 ML/M^2
BH CV ECHO MEAS - CONTRAST EF 4CH: 33.6 ML/M^2
BH CV ECHO MEAS - EDV(CUBED): 162.3 ML
BH CV ECHO MEAS - EDV(MOD-SP2): 256 ML
BH CV ECHO MEAS - EDV(MOD-SP4): 229 ML
BH CV ECHO MEAS - EDV(TEICH): 144.6 ML
BH CV ECHO MEAS - EF(CUBED): 45.2 %
BH CV ECHO MEAS - EF(MOD-SP2): 37.9 %
BH CV ECHO MEAS - EF(MOD-SP4): 33.6 %
BH CV ECHO MEAS - EF(TEICH): 37.2 %
BH CV ECHO MEAS - ESV(CUBED): 89 ML
BH CV ECHO MEAS - ESV(MOD-SP2): 159 ML
BH CV ECHO MEAS - ESV(MOD-SP4): 152 ML
BH CV ECHO MEAS - ESV(TEICH): 90.8 ML
BH CV ECHO MEAS - FS: 18.1 %
BH CV ECHO MEAS - IVS/LVPW: 0.8
BH CV ECHO MEAS - IVSD: 0.98 CM
BH CV ECHO MEAS - LAT PEAK E' VEL: 12 CM/SEC
BH CV ECHO MEAS - LV DIASTOLIC VOL/BSA (35-75): 120.7 ML/M^2
BH CV ECHO MEAS - LV MASS(C)D: 238.1 GRAMS
BH CV ECHO MEAS - LV MASS(C)DI: 125.5 GRAMS/M^2
BH CV ECHO MEAS - LV MAX PG: 4.5 MMHG
BH CV ECHO MEAS - LV MEAN PG: 2.3 MMHG
BH CV ECHO MEAS - LV SYSTOLIC VOL/BSA (12-30): 80.1 ML/M^2
BH CV ECHO MEAS - LV V1 MAX: 106.1 CM/SEC
BH CV ECHO MEAS - LV V1 MEAN: 69.2 CM/SEC
BH CV ECHO MEAS - LV V1 VTI: 20.5 CM
BH CV ECHO MEAS - LVIDD: 5.5 CM
BH CV ECHO MEAS - LVIDS: 4.5 CM
BH CV ECHO MEAS - LVLD AP2: 10.9 CM
BH CV ECHO MEAS - LVLD AP4: 10.5 CM
BH CV ECHO MEAS - LVLS AP2: 10.3 CM
BH CV ECHO MEAS - LVLS AP4: 9.9 CM
BH CV ECHO MEAS - LVOT AREA (M): 3.5 CM^2
BH CV ECHO MEAS - LVOT AREA: 3.3 CM^2
BH CV ECHO MEAS - LVOT DIAM: 2.1 CM
BH CV ECHO MEAS - LVPWD: 1.2 CM
BH CV ECHO MEAS - MED PEAK E' VEL: 4 CM/SEC
BH CV ECHO MEAS - MV A DUR: 0.12 SEC
BH CV ECHO MEAS - MV A MAX VEL: 81.9 CM/SEC
BH CV ECHO MEAS - MV DEC SLOPE: 565.4 CM/SEC^2
BH CV ECHO MEAS - MV DEC TIME: 0.15 SEC
BH CV ECHO MEAS - MV E MAX VEL: 98.7 CM/SEC
BH CV ECHO MEAS - MV E/A: 1.2
BH CV ECHO MEAS - MV MAX PG: 3.7 MMHG
BH CV ECHO MEAS - MV MEAN PG: 1.3 MMHG
BH CV ECHO MEAS - MV P1/2T MAX VEL: 99.6 CM/SEC
BH CV ECHO MEAS - MV P1/2T: 51.6 MSEC
BH CV ECHO MEAS - MV V2 MAX: 96.4 CM/SEC
BH CV ECHO MEAS - MV V2 MEAN: 52.2 CM/SEC
BH CV ECHO MEAS - MV V2 VTI: 25 CM
BH CV ECHO MEAS - MVA P1/2T LCG: 2.2 CM^2
BH CV ECHO MEAS - MVA(P1/2T): 4.3 CM^2
BH CV ECHO MEAS - MVA(VTI): 2.7 CM^2
BH CV ECHO MEAS - PA ACC SLOPE: 36.7 CM/SEC^2
BH CV ECHO MEAS - PA ACC TIME: 0.07 SEC
BH CV ECHO MEAS - PA MAX PG (FULL): 2.1 MMHG
BH CV ECHO MEAS - PA MAX PG: 4.8 MMHG
BH CV ECHO MEAS - PA PR(ACCEL): 45.7 MMHG
BH CV ECHO MEAS - PA V2 MAX: 109.9 CM/SEC
BH CV ECHO MEAS - PI END-D VEL: 142.5 CM/SEC
BH CV ECHO MEAS - PULM A REVS DUR: 0.05 SEC
BH CV ECHO MEAS - PULM A REVS VEL: 22.1 CM/SEC
BH CV ECHO MEAS - PULM DIAS VEL: 69.3 CM/SEC
BH CV ECHO MEAS - PULM S/D: 0.72
BH CV ECHO MEAS - PULM SYS VEL: 49.6 CM/SEC
BH CV ECHO MEAS - PVA(V,A): 2 CM^2
BH CV ECHO MEAS - PVA(V,D): 2 CM^2
BH CV ECHO MEAS - QP/QS: 0.69
BH CV ECHO MEAS - RV MAX PG: 2.7 MMHG
BH CV ECHO MEAS - RV MEAN PG: 1.4 MMHG
BH CV ECHO MEAS - RV V1 MAX: 81.9 CM/SEC
BH CV ECHO MEAS - RV V1 MEAN: 53.8 CM/SEC
BH CV ECHO MEAS - RV V1 VTI: 17.8 CM
BH CV ECHO MEAS - RVOT AREA: 2.7 CM^2
BH CV ECHO MEAS - RVOT DIAM: 1.8 CM
BH CV ECHO MEAS - SI(AO): 135.7 ML/M^2
BH CV ECHO MEAS - SI(CUBED): 38.6 ML/M^2
BH CV ECHO MEAS - SI(LVOT): 36 ML/M^2
BH CV ECHO MEAS - SI(MOD-SP2): 51.1 ML/M^2
BH CV ECHO MEAS - SI(MOD-SP4): 40.6 ML/M^2
BH CV ECHO MEAS - SI(TEICH): 28.4 ML/M^2
BH CV ECHO MEAS - SUP REN AO DIAM: 1.8 CM
BH CV ECHO MEAS - SV(AO): 257.6 ML
BH CV ECHO MEAS - SV(CUBED): 73.3 ML
BH CV ECHO MEAS - SV(LVOT): 68.4 ML
BH CV ECHO MEAS - SV(MOD-SP2): 97 ML
BH CV ECHO MEAS - SV(MOD-SP4): 77 ML
BH CV ECHO MEAS - SV(RVOT): 47.5 ML
BH CV ECHO MEAS - SV(TEICH): 53.9 ML
BH CV ECHO MEAS - TAPSE (>1.6): 1.4 CM2
BH CV XLRA - RV BASE: 3.7 CM
BH CV XLRA - TDI S': 13 CM/SEC
BUN BLD-MCNC: 16 MG/DL (ref 8–23)
BUN/CREAT SERPL: 18 (ref 7–25)
CALCIUM SPEC-SCNC: 8.1 MG/DL (ref 8.8–10.5)
CHLORIDE SERPL-SCNC: 98 MMOL/L (ref 98–107)
CO2 SERPL-SCNC: 27.6 MMOL/L (ref 22–29)
CREAT BLD-MCNC: 0.89 MG/DL (ref 0.76–1.27)
DEPRECATED RDW RBC AUTO: 52.6 FL (ref 37–54)
E/E' RATIO: 17
EOSINOPHIL # BLD AUTO: 0.15 10*3/MM3 (ref 0.1–0.3)
EOSINOPHIL NFR BLD AUTO: 2.1 % (ref 0–4)
ERYTHROCYTE [DISTWIDTH] IN BLOOD BY AUTOMATED COUNT: 16.7 % (ref 11.5–14.5)
GFR SERPL CREATININE-BSD FRML MDRD: 86 ML/MIN/1.73
GLUCOSE BLD-MCNC: 162 MG/DL (ref 65–99)
HCT VFR BLD AUTO: 33.6 % (ref 42–52)
HGB BLD-MCNC: 11.3 G/DL (ref 14–18)
IMM GRANULOCYTES # BLD: 0.02 10*3/MM3 (ref 0–0.03)
IMM GRANULOCYTES NFR BLD: 0.3 % (ref 0–0.5)
LEFT ATRIUM VOLUME INDEX: 32 ML/M2
LV EF 2D ECHO EST: 34 %
LYMPHOCYTES # BLD AUTO: 1.73 10*3/MM3 (ref 0.6–4.8)
LYMPHOCYTES NFR BLD AUTO: 23.8 % (ref 20–45)
MCH RBC QN AUTO: 28.8 PG (ref 27–31)
MCHC RBC AUTO-ENTMCNC: 33.6 G/DL (ref 31–37)
MCV RBC AUTO: 85.7 FL (ref 80–94)
MONOCYTES # BLD AUTO: 0.58 10*3/MM3 (ref 0–1)
MONOCYTES NFR BLD AUTO: 8 % (ref 3–8)
NEUTROPHILS # BLD AUTO: 4.76 10*3/MM3 (ref 1.5–8.3)
NEUTROPHILS NFR BLD AUTO: 65.3 % (ref 45–70)
NRBC BLD MANUAL-RTO: 0 /100 WBC (ref 0–0)
PLATELET # BLD AUTO: 197 10*3/MM3 (ref 140–500)
PMV BLD AUTO: 10.4 FL (ref 7.4–10.4)
POTASSIUM BLD-SCNC: 3.6 MMOL/L (ref 3.5–5.2)
RBC # BLD AUTO: 3.92 10*6/MM3 (ref 4.7–6.1)
SODIUM BLD-SCNC: 136 MMOL/L (ref 136–145)
TROPONIN T SERPL-MCNC: <0.01 NG/ML (ref 0–0.03)
TROPONIN T SERPL-MCNC: <0.01 NG/ML (ref 0–0.03)
WBC NRBC COR # BLD: 7.28 10*3/MM3 (ref 4.8–10.8)

## 2017-03-15 PROCEDURE — G0378 HOSPITAL OBSERVATION PER HR: HCPCS

## 2017-03-15 PROCEDURE — 76700 US EXAM ABDOM COMPLETE: CPT

## 2017-03-15 PROCEDURE — 96376 TX/PRO/DX INJ SAME DRUG ADON: CPT

## 2017-03-15 PROCEDURE — 80048 BASIC METABOLIC PNL TOTAL CA: CPT | Performed by: INTERNAL MEDICINE

## 2017-03-15 PROCEDURE — C8929 TTE W OR WO FOL WCON,DOPPLER: HCPCS

## 2017-03-15 PROCEDURE — 84484 ASSAY OF TROPONIN QUANT: CPT | Performed by: INTERNAL MEDICINE

## 2017-03-15 PROCEDURE — 99214 OFFICE O/P EST MOD 30 MIN: CPT | Performed by: INTERNAL MEDICINE

## 2017-03-15 PROCEDURE — 94799 UNLISTED PULMONARY SVC/PX: CPT

## 2017-03-15 PROCEDURE — 93306 TTE W/DOPPLER COMPLETE: CPT | Performed by: INTERNAL MEDICINE

## 2017-03-15 PROCEDURE — 85025 COMPLETE CBC W/AUTO DIFF WBC: CPT | Performed by: INTERNAL MEDICINE

## 2017-03-15 PROCEDURE — 25010000002 PERFLUTREN (DEFINITY) 8.476 MG IN SODIUM CHLORIDE 10 ML INJECTION: Performed by: HOSPITALIST

## 2017-03-15 PROCEDURE — 99217 PR OBSERVATION CARE DISCHARGE MANAGEMENT: CPT | Performed by: HOSPITALIST

## 2017-03-15 PROCEDURE — 84484 ASSAY OF TROPONIN QUANT: CPT | Performed by: HOSPITALIST

## 2017-03-15 PROCEDURE — 25010000002 HYDROMORPHONE PER 4 MG: Performed by: INTERNAL MEDICINE

## 2017-03-15 RX ADMIN — OXYCODONE HYDROCHLORIDE AND ACETAMINOPHEN 1 TABLET: 10; 325 TABLET ORAL at 09:00

## 2017-03-15 RX ADMIN — HYDROMORPHONE HYDROCHLORIDE 0.5 MG: 1 INJECTION, SOLUTION INTRAMUSCULAR; INTRAVENOUS; SUBCUTANEOUS at 06:07

## 2017-03-15 RX ADMIN — HYDROMORPHONE HYDROCHLORIDE 0.5 MG: 1 INJECTION, SOLUTION INTRAMUSCULAR; INTRAVENOUS; SUBCUTANEOUS at 01:37

## 2017-03-15 RX ADMIN — METOPROLOL SUCCINATE 50 MG: 50 TABLET, EXTENDED RELEASE ORAL at 09:04

## 2017-03-15 RX ADMIN — PERFLUTREN 2 ML: 6.52 INJECTION, SUSPENSION INTRAVENOUS at 08:29

## 2017-03-15 RX ADMIN — OXYCODONE HYDROCHLORIDE AND ACETAMINOPHEN 1 TABLET: 10; 325 TABLET ORAL at 12:50

## 2017-03-15 RX ADMIN — HYDROMORPHONE HYDROCHLORIDE 0.5 MG: 1 INJECTION, SOLUTION INTRAMUSCULAR; INTRAVENOUS; SUBCUTANEOUS at 03:37

## 2017-03-15 RX ADMIN — GLIPIZIDE 10 MG: 10 TABLET ORAL at 09:04

## 2017-03-15 RX ADMIN — FUROSEMIDE 40 MG: 40 TABLET ORAL at 09:04

## 2017-03-15 RX ADMIN — BUDESONIDE AND FORMOTEROL FUMARATE DIHYDRATE 2 PUFF: 160; 4.5 AEROSOL RESPIRATORY (INHALATION) at 09:23

## 2017-03-15 RX ADMIN — SPIRONOLACTONE 12.5 MG: 25 TABLET ORAL at 09:55

## 2017-03-15 RX ADMIN — AMLODIPINE BESYLATE 5 MG: 5 TABLET ORAL at 09:55

## 2017-03-15 RX ADMIN — ISOSORBIDE MONONITRATE 60 MG: 60 TABLET, EXTENDED RELEASE ORAL at 09:04

## 2017-03-15 RX ADMIN — PANTOPRAZOLE SODIUM 40 MG: 40 INJECTION, POWDER, FOR SOLUTION INTRAVENOUS at 09:04

## 2017-03-15 NOTE — DISCHARGE SUMMARY
Philippe Giron  1955  [id number]        Hospitalists Discharge Summary    Date of Admission: 3/14/2017  Date of Discharge:  3/15/2017    Primary Discharge Diagnoses:    1.  Chest Pain  2.  LUQ Abdominal Pain  3.  Chronic systolic CHF    Secondary Discharge Diagnoses:    1.  Chronic calcific pancreatitis  2.  Diabetes Mellitus, Type 2  3.  HTN  4.  Dyslipidemia  5.  BETTINA         PCP  Patient Care Team:  SERGEY Cornejo as PCP - General (Family Medicine)    Consults:   Consults     Date and Time Order Name Status Description    3/14/2017 2220 Inpatient Consult to Gastroenterology      3/14/2017 1649 Inpatient Consult to Cardiothoracic Surgery Completed           Operations and Procedures Performed:       Us Abdomen Complete    Result Date: 3/15/2017  Narrative: ULTRASOUND ABDOMEN, COMPLETE, 03/15/2017:  HISTORY: 61-year-old male admitted to the ED yesterday with chest pain and elevated blood pressure. He has a history of pancreatitis. Complains of abdomen pain beginning yesterday morning.  TECHNIQUE: Grayscale ultrasound imaging of the upper abdomen.  COMPARISON: *  CT abdomen/pelvis, 03/14/2017.  FINDINGS: Gallbladder is negative. No visible cholelithiasis or wall thickening. No intrahepatic or extra hepatic bile duct dilatation (CBD 5 mm). Liver and spleen are normal in size and appearance.  Parenchymal calcifications are visible within the visualized mid body of the pancreas compatible with chronic calcific pancreatitis. Pancreas was better visualized on CT yesterday. Abdominal aorta is normal in caliber, and the intrahepatic IVC is patent.  Small nonobstructing calculus in the left mid kidney. Small benign cyst arising from the surface of the right mid kidney. No evidence of urinary obstruction. Left lower pole renal infarct visible on CT yesterday is not appreciable on ultrasound. Tiny bilateral pleural effusions are visible.      Impression: 1. Normal gallbladder. No cholelithiasis or bile duct  dilatation. 2. Chronic calcific pancreatitis. 3. Nonobstructing left renal stone. Small benign right renal cyst. 4. Tiny bilateral pleural effusions.  This report was finalized on 3/15/2017 2:12 PM by Dr. Andrade Rodriguez MD.      Ct Abdomen Pelvis With Contrast    Result Date: 3/14/2017  Narrative: CT ABDOMEN AND PELVIS WITH CONTRAST, 03/14/2017:  HISTORY: 60-year-old male in the ED complaining of epigastric pain and left upper quadrant abdomen pain with nausea beginning earlier this morning. The patient notes a history of intermittent pancreatitis over the prior five years. Lipase levels are currently normal.  TECHNIQUE: CT examination of the abdomen and pelvis with IV contrast. GI contrast was not ordered. Radiation dose reduction techniques were utilized, including automated exposure control and exposure modulation based on body size.  LOWER CHEST FINDINGS: Lung base images show mild interstitial edema and small bilateral pleural effusions suggesting vascular congestion. Clinical correlation recommended. No pericardial effusion.  ABDOMEN FINDINGS: Diffuse chronic calcific pancreatitis, but no evidence of acute pancreatitis at this time. Nondistended gallbladder. No bile duct or pancreatic duct dilatation. Liver and spleen are normal in size and appearance.  There is a small subacute or chronic infarct in the lower pole left kidney, not present on a CTA examination of 08/09/2013. Nonobstructing 7 mm left mid renal calculus. Small right lower renal cyst. No evidence of urinary obstruction. Normal caliber abdominal aorta.  Mild sigmoid diverticulosis. Small bowel and colon are otherwise normal in caliber and appearance, as imaged. The appendix is normal.  PELVIS FINDINGS: Urinary bladder, prostate and rectum are within normal limits.      Impression: 1. Lung base images showing vascular congestion with mild interstitial edema and small bilateral pleural effusions. 2. Small subacute or chronic left lower pole renal  infarct. 3. Chronic calcific pancreatitis. No CT evidence of acute pancreatitis at this time. 4. Mild diverticulosis. Normal appendix. 5. Nonobstructing left mid renal calculus.  This report was finalized on 3/14/2017 11:11 AM by Dr. Andrade Rodriguez MD.      Xr Chest 1 View    Result Date: 3/14/2017  Narrative: CHEST X-RAY, 03/14/2017:  HISTORY: 61-year-old male in the ED complaining of read a history of chest pain and shortness of air.  TECHNIQUE: AP portable upright chest x-ray.  FINDINGS: Mild cardiomegaly is stable. Pulmonary venous redistribution, mild diffuse interstitial edema and tiny bilateral pleural effusions suggesting mild vascular congestion. This is similar when compared with the previous study of 07/14/2016. Prior CABG. Cardiac pacer/ICD.      Impression: 1. Mild vascular congestion with diffuse interstitial edema and tiny pleural effusions. Similar findings were present on 07/14/2016. 2. Stable mild cardiomegaly. CABG.  This report was finalized on 3/14/2017 12:26 PM by Dr. Andrade Rodriguez MD.        Allergies:  has No Known Allergies.      Discharge Medications:   Philippe Giron   Home Medication Instructions WHIT:[id number]    Printed on:03/15/17 2937   Medication Information                      albuterol (PROVENTIL HFA;VENTOLIN HFA) 108 (90 BASE) MCG/ACT inhaler  Ventolin  (90 Base) MCG/ACT Inhalation Aerosol Solution; Patient Sig: Ventolin  (90 Base) MCG/ACT Inhalation Aerosol Solution ; 0; 08-Apr-2015; Active             albuterol (PROVENTIL) (2.5 MG/3ML) 0.083% nebulizer solution  Take 2.5 mg by nebulization every 4 (four) hours as needed for wheezing.             aspirin 81 MG tablet  Take 81 mg by mouth Daily.             budesonide-formoterol (SYMBICORT) 160-4.5 MCG/ACT inhaler  Symbicort 160-4.5 MCG/ACT Inhalation Aerosol; Patient Sig: Symbicort 160-4.5 MCG/ACT Inhalation Aerosol ; 0; 08-Apr-2015; Active             furosemide (LASIX) 40 MG tablet  Take 40 mg by mouth  Daily.             gabapentin (NEURONTIN) 600 MG tablet  Take 600 mg by mouth 3 (three) times a day.             glipiZIDE (GLUCOTROL) 10 MG tablet  Take 10 mg by mouth 2 (Two) Times a Day Before Meals.             isosorbide mononitrate (IMDUR) 60 MG 24 hr tablet  Take 60 mg by mouth Daily.             metoprolol succinate XL (TOPROL-XL) 50 MG 24 hr tablet  Take 50 mg by mouth Daily.             nitroglycerin (NITROSTAT) 0.4 MG SL tablet  Place 0.4 mg under the tongue Every 5 (Five) Minutes As Needed for chest pain. Take no more than 3 doses in 15 minutes.             oxyCODONE-acetaminophen (PERCOCET)  MG per tablet  Take 1 tablet by mouth Every 4 (Four) Hours As Needed for Moderate Pain (4-6).             potassium chloride (MICRO-K) 10 MEQ CR capsule  Take  by mouth.             prasugrel (EFFIENT) tablet  Take  by mouth daily.             rOPINIRole (REQUIP) 2 MG tablet  Take 4 mg by mouth Every Night.             Sacubitril-Valsartan (ENTRESTO PO)  Take  by mouth 2 (Two) Times a Day. Pt reports he receives his med in sample form from his cardiologist and does not know the dosage but takes twice a day.             spironolactone (ALDACTONE) 25 MG tablet  Take 12.5 mg by mouth Daily.                 History of Present Illness (taken from H&P):  60 yo M presents with 2 hours of epigastric and LUQ abdominal pain, hx of pancreatitis and reports this pain is similar. IV established, given IV dilaudid and zofran, NS bolus. Labs including lipase, lactate obtained. CT abdomen w/ IV contrast ordered. Initial labs reassuring, with normal lipase, normal troponin. He had initial symptom improvement, however later complained of chest pain, became very diaphoretic and hypertensive, and dyspneic, with crackles noted at lung bases bilaterally. He was given nitro SL with reduction of his BP, placed on BIPAP due to concern flash pulmonary edema, and given lasix 40 IV, . Serial EKGs obtained. He has baseline LBBB  "with rate dependent lateral ST depressions, no ST elevation or Sgarbossa criteria for STEMI. His pain resolved with BP reduction, and work of breathing improved greatly, trialing off BIPAP at this time. Will require admission for serial troponins, monitoring of blood pressure, diuresis. Later review of his chart reveals EF 25% on prior ECHO.    He is accepted to the ICU by Dr. Hill after 3 hour troponin returned normal.         On my interview pt reaffirms the above. Admitted to ICU doing better CP free. Off bipap on 2L NC, eating at bedside. No wheezes, no f/c/s/n/v/d. abd pain improved.     Hospital Course  Mr. Giron was admitted to the ICU as noted above.  He did well overnight denying further pain and dyspnea.  Imaging confirmed chronic calcific pancreatitis without evidence of acute pancreatitis.  He continued to tolerate his diet and declined further testing and subspecialty consultation.    He felt frustrated because when he does have pain, he is treated like a \"drug seeker\" because his lipase levels are usually normal  I explained to him it is quite possible to have pain from \"chronic pancreatitis\" without changes in the enzyme levels.  I also explained to him this should be closely followed.     Last Lab Results:   Lab Results (most recent)     Procedure Component Value Units Date/Time    CBC & Differential [[id number]] Collected:  03/14/17 0933    Specimen:  Blood Updated:  03/14/17 0954    Narrative:       The following orders were created for panel order CBC & Differential.  Procedure                               Abnormality         Status                     ---------                               -----------         ------                     CBC Auto Differential[[id number]]         Abnormal            Final result                 Please view results for these tests on the individual orders.    CBC Auto Differential [[id number]]  (Abnormal) Collected:  03/14/17 0933    Specimen:  Blood Updated:  " 03/14/17 0954     WBC 10.46 10*3/mm3      RBC 4.83 10*6/mm3      Hemoglobin 13.9 (L) g/dL      Hematocrit 41.7 (L) %      MCV 86.3 fL      MCH 28.8 pg      MCHC 33.3 g/dL      RDW 17.0 (H) %      RDW-SD 53.8 fl      MPV 10.1 fL      Platelets 237 10*3/mm3      Neutrophil % 79.9 (H) %      Lymphocyte % 12.1 (L) %      Monocyte % 6.2 %      Eosinophil % 1.0 %      Basophil % 0.5 %      Immature Grans % 0.3 %      Neutrophils, Absolute 8.36 (H) 10*3/mm3      Lymphocytes, Absolute 1.27 10*3/mm3      Monocytes, Absolute 0.65 10*3/mm3      Eosinophils, Absolute 0.10 10*3/mm3      Basophils, Absolute 0.05 10*3/mm3      Immature Grans, Absolute 0.03 10*3/mm3      nRBC 0.0 /100 WBC     Lactic Acid, Plasma [[id number]]  (Normal) Collected:  03/14/17 0933    Specimen:  Blood Updated:  03/14/17 1007     Lactate 1.1 mmol/L     Lipase [[id number]]  (Normal) Collected:  03/14/17 0933    Specimen:  Blood Updated:  03/14/17 1007     Lipase 37 U/L     Comprehensive Metabolic Panel [[id number]]  (Abnormal) Collected:  03/14/17 0933    Specimen:  Blood Updated:  03/14/17 1013     Glucose 206 (H) mg/dL      BUN 19 mg/dL      Creatinine 1.04 mg/dL      Sodium 140 mmol/L      Potassium 3.9 mmol/L      Chloride 101 mmol/L      CO2 25.8 mmol/L      Calcium 9.4 mg/dL      Total Protein 7.6 g/dL      Albumin 4.00 g/dL      ALT (SGPT) 17 U/L      AST (SGOT) 14 U/L       Specimen hemolyzed.  Results may be affected.        Alkaline Phosphatase 68 U/L      Total Bilirubin 0.4 mg/dL      eGFR Non African Amer 72 mL/min/1.73      Globulin 3.6 gm/dL      A/G Ratio 1.1 g/dL      BUN/Creatinine Ratio 18.3      Anion Gap 13.2 mmol/L     Troponin [[id number]]  (Normal) Collected:  03/14/17 0933    Specimen:  Blood Updated:  03/14/17 1137     Troponin T <0.010 ng/mL     Narrative:       Troponin T Reference Ranges:  Less than 0.03 ng/mL:    Negative for AMI  0.03 to 0.09 ng/mL:      Indeterminant for AMI  Greater than 0.09 ng/mL: Positive for AMI    Troponin  [[id number]]  (Normal) Collected:  03/14/17 1227    Specimen:  Blood from Hand, Left Updated:  03/14/17 1313     Troponin T <0.010 ng/mL     Narrative:       Troponin T Reference Ranges:  Less than 0.03 ng/mL:    Negative for AMI  0.03 to 0.09 ng/mL:      Indeterminant for AMI  Greater than 0.09 ng/mL: Positive for AMI    Troponin [[id number]]  (Normal) Collected:  03/14/17 1801    Specimen:  Blood Updated:  03/14/17 1828     Troponin T <0.010 ng/mL     Narrative:       Troponin T Reference Ranges:  Less than 0.03 ng/mL:    Negative for AMI  0.03 to 0.09 ng/mL:      Indeterminant for AMI  Greater than 0.09 ng/mL: Positive for AMI    Troponin [[id number]]  (Normal) Collected:  03/15/17 0000    Specimen:  Blood Updated:  03/15/17 0026     Troponin T <0.010 ng/mL     Narrative:       Troponin T Reference Ranges:  Less than 0.03 ng/mL:    Negative for AMI  0.03 to 0.09 ng/mL:      Indeterminant for AMI  Greater than 0.09 ng/mL: Positive for AMI    CBC & Differential [[id number]] Collected:  03/15/17 0633    Specimen:  Blood Updated:  03/15/17 0740    Narrative:       The following orders were created for panel order CBC & Differential.  Procedure                               Abnormality         Status                     ---------                               -----------         ------                     CBC Auto Differential[[id number]]         Abnormal            Final result                 Please view results for these tests on the individual orders.    CBC Auto Differential [[id number]]  (Abnormal) Collected:  03/15/17 0633    Specimen:  Blood Updated:  03/15/17 0740     WBC 7.28 10*3/mm3      RBC 3.92 (L) 10*6/mm3      Hemoglobin 11.3 (L) g/dL      Hematocrit 33.6 (L) %      MCV 85.7 fL      MCH 28.8 pg      MCHC 33.6 g/dL      RDW 16.7 (H) %      RDW-SD 52.6 fl      MPV 10.4 fL      Platelets 197 10*3/mm3      Neutrophil % 65.3 %      Lymphocyte % 23.8 %      Monocyte % 8.0 %      Eosinophil % 2.1 %      Basophil % 0.5 %       Immature Grans % 0.3 %      Neutrophils, Absolute 4.76 10*3/mm3      Lymphocytes, Absolute 1.73 10*3/mm3      Monocytes, Absolute 0.58 10*3/mm3      Eosinophils, Absolute 0.15 10*3/mm3      Basophils, Absolute 0.04 10*3/mm3      Immature Grans, Absolute 0.02 10*3/mm3      nRBC 0.0 /100 WBC     Troponin [[id number]]  (Normal) Collected:  03/15/17 0633    Specimen:  Blood Updated:  03/15/17 0740     Troponin T <0.010 ng/mL     Narrative:       Troponin T Reference Ranges:  Less than 0.03 ng/mL:    Negative for AMI  0.03 to 0.09 ng/mL:      Indeterminant for AMI  Greater than 0.09 ng/mL: Positive for AMI    Basic Metabolic Panel [[id number]]  (Abnormal) Collected:  03/15/17 0633    Specimen:  Blood Updated:  03/15/17 0741     Glucose 162 (H) mg/dL      BUN 16 mg/dL      Creatinine 0.89 mg/dL      Sodium 136 mmol/L      Potassium 3.6 mmol/L      Chloride 98 mmol/L      CO2 27.6 mmol/L      Calcium 8.1 (L) mg/dL      eGFR Non African Amer 86 mL/min/1.73      BUN/Creatinine Ratio 18.0      Anion Gap 10.4 mmol/L     Narrative:       GFR Normal >60  Chronic Kidney Disease <60  Kidney Failure <15        Imaging Results (most recent)     Procedure Component Value Units Date/Time    CT Abdomen Pelvis With Contrast [[id number]] Collected:  03/14/17 1104     Updated:  03/14/17 1113    Narrative:       CT ABDOMEN AND PELVIS WITH CONTRAST, 03/14/2017:     HISTORY:  60-year-old male in the ED complaining of epigastric pain and left upper  quadrant abdomen pain with nausea beginning earlier this morning. The  patient notes a history of intermittent pancreatitis over the prior five  years. Lipase levels are currently normal.     TECHNIQUE:  CT examination of the abdomen and pelvis with IV contrast. GI contrast  was not ordered. Radiation dose reduction techniques were utilized,  including automated exposure control and exposure modulation based on  body size.     LOWER CHEST FINDINGS:  Lung base images show mild interstitial edema and  small bilateral  pleural effusions suggesting vascular congestion. Clinical correlation  recommended. No pericardial effusion.     ABDOMEN FINDINGS:  Diffuse chronic calcific pancreatitis, but no evidence of acute  pancreatitis at this time. Nondistended gallbladder. No bile duct or  pancreatic duct dilatation.  Liver and spleen are normal in size and appearance.     There is a small subacute or chronic infarct in the lower pole left  kidney, not present on a CTA examination of 08/09/2013. Nonobstructing 7  mm left mid renal calculus. Small right lower renal cyst. No evidence of  urinary obstruction. Normal caliber abdominal aorta.     Mild sigmoid diverticulosis. Small bowel and colon are otherwise normal  in caliber and appearance, as imaged. The appendix is normal.     PELVIS FINDINGS:  Urinary bladder, prostate and rectum are within normal limits.       Impression:       1. Lung base images showing vascular congestion with mild interstitial  edema and small bilateral pleural effusions.  2. Small subacute or chronic left lower pole renal infarct.  3. Chronic calcific pancreatitis. No CT evidence of acute pancreatitis  at this time.  4. Mild diverticulosis. Normal appendix.  5. Nonobstructing left mid renal calculus.     This report was finalized on 3/14/2017 11:11 AM by Dr. Andrade Rodriguez MD.       XR Chest 1 View [[id number]] Collected:  03/14/17 1224     Updated:  03/14/17 1228    Narrative:       CHEST X-RAY, 03/14/2017:     HISTORY:   61-year-old male in the ED complaining of read a history of chest pain  and shortness of air.     TECHNIQUE:  AP portable upright chest x-ray.     FINDINGS:  Mild cardiomegaly is stable. Pulmonary venous redistribution, mild  diffuse interstitial edema and tiny bilateral pleural effusions  suggesting mild vascular congestion. This is similar when compared with  the previous study of 07/14/2016. Prior CABG. Cardiac pacer/ICD.       Impression:       1. Mild vascular congestion  with diffuse interstitial edema and tiny  pleural effusions. Similar findings were present on 07/14/2016.  2. Stable mild cardiomegaly. CABG.     This report was finalized on 3/14/2017 12:26 PM by Dr. Andrade Rodriguez MD.       US Abdomen Complete [[id number]] Collected:  03/15/17 1406     Updated:  03/15/17 1414    Narrative:       ULTRASOUND ABDOMEN, COMPLETE, 03/15/2017:     HISTORY:  61-year-old male admitted to the ED yesterday with chest pain and  elevated blood pressure. He has a history of pancreatitis. Complains of  abdomen pain beginning yesterday morning.     TECHNIQUE:  Grayscale ultrasound imaging of the upper abdomen.     COMPARISON:  *  CT abdomen/pelvis, 03/14/2017.     FINDINGS:  Gallbladder is negative. No visible cholelithiasis or wall thickening.  No intrahepatic or extra hepatic bile duct dilatation (CBD 5 mm). Liver  and spleen are normal in size and appearance.     Parenchymal calcifications are visible within the visualized mid body of  the pancreas compatible with chronic calcific pancreatitis. Pancreas was  better visualized on CT yesterday. Abdominal aorta is normal in caliber,  and the intrahepatic IVC is patent.     Small nonobstructing calculus in the left mid kidney. Small benign cyst  arising from the surface of the right mid kidney. No evidence of urinary  obstruction. Left lower pole renal infarct visible on CT yesterday is  not appreciable on ultrasound. Tiny bilateral pleural effusions are  visible.       Impression:       1. Normal gallbladder. No cholelithiasis or bile duct dilatation.  2. Chronic calcific pancreatitis.  3. Nonobstructing left renal stone. Small benign right renal cyst.  4. Tiny bilateral pleural effusions.     This report was finalized on 3/15/2017 2:12 PM by Dr. Andrade Rodriguez MD.             PROCEDURES      Condition on Discharge: Stable    Physical Exam at Discharge  Vital Signs  Temp:  [97.7 °F (36.5 °C)-98.6 °F (37 °C)] 97.7 °F (36.5 °C)  Heart Rate:   [59-90] 60  Resp:  [18-20] 18  BP: (101-180)/() 118/78    Physical Exam:  Physical Exam   Constitutional: Patient appears well-developed and well-nourished and in no acute distress   Cardiovascular: Regular rate, regular rhythm, S1 normal and S2 normal.  Exam reveals no gallop and no friction rub.  No murmur heard.  Pulmonary/Chest: Lungs are clear to auscultation bilaterally. No respiratory distress. No wheezes. No rhonchi. No rales.   Abdominal: Soft. Bowel sounds are normal. No distension and no mass.  There is no tenderness.   Neurological: Patient is alert and oriented to person, place, and time. Cranial nerves II-XII are grossly intact with no focal deficits.    Discharge Disposition  Home    Visiting Nurse:    No     Home PT/OT:  No     Home Safety Evaluation:  No     DME  None    Discharge Diet:           Dietary Orders            Start     Ordered    03/15/17 1342  Diet Regular; Cardiac  Diet Effective Now     Question Answer Comment   Diet Texture / Consistency Regular    Common Modifiers Cardiac        03/15/17 1342          Activity at Discharge:  As tolerated      Follow-up Appointments  No future appointments.  Additional Instructions for the Follow-ups that You Need to Schedule     Additional Discharge Follow-Up (Specify Provider)    As directed    To:  Dr. Roberts   Follow Up:  2 Weeks       Discharge Follow-Up With Specified Provider    As directed    To:  Dr. Gregg Dave   Follow Up:  2 Weeks   Follow Up Details:  [phone]       Discharge Follow-up with PCP    As directed    Follow Up Details:  1-2 weeks                 Test Results Pending at Discharge       Anderson Mays MD  03/15/17  2:52 PM

## 2017-03-15 NOTE — DISCHARGE INSTR - APPOINTMENTS
Armando GONZALEZ MD                                                  Patient has discharge follow-up on Wednesday 29, 2017 @ 2:45pm.  Internist in [place], [place]   Address: [address], [place] [address]   Phone: [phone]        SERGEY Whitman Family Medicine         Patient has discharge follow-up appointment on Tuesday March 21,2017@ 2:30pm.  [phone]   [address] [place] [address]            Dr. Gregg Dave    Patient is to call to schedule new patient appointment .      Address: [address] [place] [address] [address] [address]   [phone]

## 2017-03-15 NOTE — NURSING NOTE
3/15/17 @ 7000-  I SPOKE WITH PATIENT AT BEDSIDE- HE LIVES WITH HIS WIFE- HE IS NORMALLY ACTIVE, INDEPENDENT, AND DRIVES- HE HAS A NEBULIZER- DENIES ANY OTHER MEDICAL EQUIPMENT- HE PLANS ON RETURNING HOME WITH HIS SPOUSE AT DISCHARGE AND HOPES IT IS TODAY AFTER HIS TESTS

## 2017-03-15 NOTE — PLAN OF CARE
Problem: Patient Care Overview (Adult)  Goal: Plan of Care Review  Outcome: Ongoing (interventions implemented as appropriate)    03/15/17 0214   Coping/Psychosocial Response Interventions   Plan Of Care Reviewed With patient   Patient Care Overview   Progress progress towards functional goals is fair   Outcome Evaluation   Outcome Summary/Follow up Plan pt c/o chronic abdominal pain; VSS; pt remains agitated and argumentative regarding care       Goal: Adult Individualization and Mutuality  Outcome: Ongoing (interventions implemented as appropriate)  Goal: Discharge Needs Assessment  Outcome: Ongoing (interventions implemented as appropriate)    Problem: Pain, Acute (Adult)  Goal: Acceptable Pain Control/Comfort Level                  Problem: Pain, Chronic (Adult)  Goal: Identify Related Risk Factors and Signs and Symptoms  Outcome: Outcome(s) achieved Date Met:  03/15/17    03/15/17 0214   Pain, Chronic   Related Risk Factors (Chronic Pain) disease process;coping ineffective   Signs and Symptoms (Chronic Pain) sleep pattern change;guarding behavior/splinting/limp;impaired thought process/concentration;pacing/restlessness;verbalization of pain descriptors       Goal: Acceptable Pain Control/Comfort Level  Outcome: Ongoing (interventions implemented as appropriate)    03/15/17 0214   Pain, Chronic (Adult)   Acceptable Pain Control/Comfort Level making progress toward outcome         Problem: Cardiac Output, Decreased (Adult)  Goal: Identify Related Risk Factors and Signs and Symptoms  Outcome: Outcome(s) achieved Date Met:  03/15/17    03/15/17 0214   Cardiac Output, Decreased   Cardiac Output, Decreased: Related Risk Factors heart rhythm altered;disease process   Signs and Symptoms (Cardiac Output Decreased) angina;edema       Goal: Adequate Cardiac Output/Effective Tissue Perfusion  Outcome: Ongoing (interventions implemented as appropriate)    03/15/17 0214   Cardiac Output, Decreased (Adult)   Adequate Cardiac  Output/Effective Tissue Perfusion making progress toward outcome         Problem: Acute Coronary Syndrome (ACS) (Adult)  Goal: Signs and Symptoms of Listed Potential Problems Will be Absent or Manageable (Acute Coronary Syndrome)  Outcome: Ongoing (interventions implemented as appropriate)    03/15/17 0214   Acute Coronary Syndrome (ACS)   Problems Assessed (Acute Coronary Syndrome (ACS)) all   Problems Present (Acute Coronary Syndrome (ACS)) chest pain (angina);situational response

## 2017-03-15 NOTE — CONSULTS
Patient Name: Philippe Giron  :1955  61 y.o.    Date of Admission: 3/14/2017  Date of Consultation:  03/15/17  Encounter Provider: Chance Page III, MD  Place of Service: [facility] CARDIOLOGY  Referring Provider: Sharita Hill DO  Patient Care Team:  SERGEY Cornejo as PCP - General (Family Medicine)      Chief complaint:  Chest discomfort; CHF; Tachycardia    History of Present Illness:      This is a 61-year-old male who is followed by Dr. Roberts with [facility].  The patient has a history of CAD, status post prior CABG as well as prior catheter-based interventions.  He has an ischemic cardiomyopathy and also has a RCA stent in place.  He has a history of hypertension and hyperlipidemia.  He just had a cardiac catheterization performed at [facility] on  of this year when he presented with chest discomfort; details of this are listed below.  No intervention was indicated and continuation of medical therapy was recommended.    Patient presented yesterday to the emergency room with epigastric and left upper quadrant abdominal pain.  He has a history of pancreatitis and it felt like his prior episodes of pancreatitis.  While in the emergency room, he had increasing abdominal pain and his blood pressure went up significantly.  He then started having complaints of chest discomfort with diaphoresis and dyspnea.  Patient was given nitroglycerin.  He was also placed on BiPAP with concern of edema, although his chest x-ray did not show any significant abnormality.  He was given nitroglycerin to help both with the chest pain and the blood pressure.  He had a CT of his abdomen and pelvis that showed vascular congestion with mild interstitial edema and small bilateral pleural effusions, a small subacute or chronic left lower pole renal infarct, chronic calcific pancreatitis but no evidence of acute pancreatitis, mild diverticulosis, and nonobstructing left  mid renal calculus.    Patient states that he had been feeling fine until yesterday.  He's not been having any other spells of chest pain or chest discomfort.  No problem with shortness of breath.  He had not had issues with abdominal discomfort.  He states that he feels fine, he does not want to be in the hospital, he wants to go home now.    An echocardiogram was ordered on admission and      Cardiac Cath- 3/2/17  CORONARY ANGIOGRAPHY:  1.  Left main trunk arises from left sinus of Valsalva divides into left  anterior descending artery and left circumflex artery.  Left main trunk is  free of atherosclerosis.  2.  Left anterior descending artery is a moderate sized artery.  It is 100%  occluded, probably in the mid segment, there is a high grade stenosis in the  ostial portion of the LAD.  The diagonal branch appears to have a moderate to  severe stenosis.  3.  Left circumflex artery, arises from left main trunk.  It is a moderate to  large size artery, gives rise to a moderate to large sized obtuse marginal  branch.  There appears to be a stent extending from the proximal portion of  the circumflex all the way into the obtuse marginal branch which is widely  patent.  There is about 70-80% stenosis of the circumflex artery beyond the  stent and appears to have been jailed by the stent.  4.  Right coronary artery, arises from right sinus of Valsalva.  It is a  moderate to large size artery.  It has evidence of stent in the mid segment,  it is 100% occluded after the origin of the second acute marginal branch.  5.  Left internal mammary artery is widely patent and it appears to be  supplying the diagonal in the distal LAD with some faint collaterals to the  right coronary artery and it is widely patent at anastomosis.  6.  No saphenous vein graft we utilized during this angiogram.  7.  Aortogram appears to show mildly enlarged ascending aorta with no  visualization of the saphenous vein graft.  8.  An LV gram was not  performed.  OVERALL IMPRESSION:  1.  Patent left main trunk.  2.  Severe ostial left anterior descending stenosis and 100% mid left anterior  descending stenosis.  High grade stenosis of the diagonal branch.  3.  Patent stented proximal circumflex extending into the moderate to large  sized obtuse marginal branch with 80% jailed circumflex artery within the  stent.  4.  There is 100% stenosis of the mid right coronary artery after the second  obtuse marginal branch at the level of the stent.  5.  Patent left internal mammary artery to left anterior descending diagonal.  6.  Saphenous vein graft to the right, circumflex not visualized.  7.  Mildly enlarged ascending aorta with no visualization of the saphenous  vein graft.  In view of the above findings, we will treat the patient conservatively and  obtain an echocardiogram in the morning.    Echo- 11/2015  Left ventricle: The cavity size was mildly dilated. There was    mild focal basal hypertrophy of the posterior wall. Systolic    function was severely reduced. The estimated ejection fraction    was in the range of 20% to 25%. Moderate diffuse hypokinesis.    Features are consistent with a pseudonormal left ventricular    filling pattern, with concomitant abnormal relaxation and    increased filling pressure (grade 2 diastolic dysfunction).  - Regional wall motion abnormality: Akinesis of the entire inferior    and mid inferolateral myocardium.  - Mitral valve: There was mild regurgitation.  - Left atrium: The atrium was mildly dilated.  - Right ventricle: Systolic function was mildly reduced.  - Inferior vena cava: The vessel was dilated; the respirophasic    diameter changes were blunted (< 50%); findings are consistent    with elevated central venous pressure.    Past Medical History   Diagnosis Date   • Arthritis    • Chest pain    • COPD (chronic obstructive pulmonary disease)    • Diabetes    • Heart attack    • Heart disease    • HTN (hypertension)    •  Hyperlipidemia    • Hypertension    • Pancreatitis    • Sleep apnea        Past Surgical History   Procedure Laterality Date   • Coronary artery bypass graft     • Coronary angioplasty with stent placement     • Incision and drainage abscess     • Head/neck abscess incision and drainage N/A 3/17/2016     Procedure: HEAD NECK ABSCESS INCISION AND DRAINAGE-POSTERIOR NECK with cultures;  Surgeon: Liz Hernandez DO;  Location: [facility];  Service:          Prior to Admission medications    Medication Sig Start Date End Date Taking? Authorizing Provider   albuterol (PROVENTIL HFA) 108 (90 BASE) MCG/ACT inhaler Inhale 2 puffs Every 4 (Four) Hours As Needed for Wheezing.   Yes Historical Provider, MD   albuterol (PROVENTIL) (2.5 MG/3ML) 0.083% nebulizer solution Take 2.5 mg by nebulization every 4 (four) hours as needed for wheezing. 7/14/16  Yes Tonny Carlos MD   aspirin 81 MG tablet Take 81 mg by mouth Daily. 4/8/15  Yes Historical Provider, MD   aspirin 81 MG tablet Take 81 mg by mouth Daily.   Yes Historical Provider, MD   budesonide-formoterol (SYMBICORT) 160-4.5 MCG/ACT inhaler Symbicort 160-4.5 MCG/ACT Inhalation Aerosol; Patient Sig: Symbicort 160-4.5 MCG/ACT Inhalation Aerosol ; 0; 08-Apr-2015; Active 4/8/15  Yes Historical Provider, MD   budesonide-formoterol (SYMBICORT) 160-4.5 MCG/ACT inhaler Inhale 2 puffs 2 (Two) Times a Day. PRN wheezing   Yes Historical Provider, MD   furosemide (LASIX) 40 MG tablet Take 40 mg by mouth Daily.   Yes Historical Provider, MD   glipiZIDE (GLUCOTROL) 10 MG tablet Take 10 mg by mouth 2 (Two) Times a Day Before Meals. 4/8/15  Yes Historical Provider, MD   glipiZIDE (GLUCOTROL) 10 MG tablet Take 10 mg by mouth 2 (Two) Times a Day.   Yes Historical Provider, MD   ibuprofen (ADVIL,MOTRIN) 800 MG tablet Take 1 tablet by mouth every 8 (eight) hours as needed for moderate pain (4-6) for up to 60 doses. 3/17/16  Yes Liz Hernandez DO   isosorbide mononitrate (IMDUR) 60 MG  24 hr tablet Take 60 mg by mouth Daily.   Yes Historical Provider, MD   metFORMIN (GLUCOPHAGE) 1000 MG tablet Take 1,000 mg by mouth 2 (Two) Times a Day With Meals. 4/8/15  Yes Historical Provider, MD   metFORMIN (GLUCOPHAGE) 1000 MG tablet Take 1,000 mg by mouth 2 (Two) Times a Day.   Yes Historical Provider, MD   metoprolol succinate XL (TOPROL-XL) 50 MG 24 hr tablet Take 50 mg by mouth Daily.   Yes Historical Provider, MD   nitroglycerin (NITROSTAT) 0.4 MG SL tablet Place 0.4 mg under the tongue Every 5 (Five) Minutes As Needed for chest pain. Take no more than 3 doses in 15 minutes.   Yes Historical Provider, MD   oxyCODONE-acetaminophen (PERCOCET)  MG per tablet Take 1 tablet by mouth Every 4 (Four) Hours As Needed for Moderate Pain (4-6).   Yes Historical Provider, MD   rOPINIRole (REQUIP) 2 MG tablet Take 4 mg by mouth Every Night.   Yes Historical Provider, MD   Sacubitril-Valsartan (ENTRESTO PO) Take  by mouth 2 (Two) Times a Day. Pt reports he receives his med in sample form from his cardiologist and does not know the dosage but takes twice a day.   Yes Historical Provider, MD   spironolactone (ALDACTONE) 25 MG tablet Take 12.5 mg by mouth Daily.   Yes Historical Provider, MD   albuterol (PROVENTIL HFA;VENTOLIN HFA) 108 (90 BASE) MCG/ACT inhaler Ventolin  (90 Base) MCG/ACT Inhalation Aerosol Solution; Patient Sig: Ventolin  (90 Base) MCG/ACT Inhalation Aerosol Solution ; 0; 08-Apr-2015; Active 4/8/15   Historical Provider, MD   gabapentin (NEURONTIN) 600 MG tablet Take 600 mg by mouth 3 (three) times a day.    Historical Provider, MD   MethylPREDNISolone (MEDROL) 4 MG tablet follow package directions 7/14/16   Tonny Carlos MD   nitroglycerin (NITROSTAT) 0.4 MG SL tablet Place  under the tongue. 4/8/15   Historical Provider, MD   potassium chloride (MICRO-K) 10 MEQ CR capsule Take  by mouth. 4/8/15   Historical Provider, MD   prasugrel (EFFIENT) tablet Take  by mouth daily. 4/8/15    Historical Provider, MD       No Known Allergies    Social History     Social History   • Marital status:      Spouse name: N/A   • Number of children: N/A   • Years of education: N/A     Social History Main Topics   • Smoking status: Current Every Day Smoker     Packs/day: 1.00     Years: 50.00   • Smokeless tobacco: None   • Alcohol use No   • Drug use: No   • Sexual activity: Defer     Other Topics Concern   • None     Social History Narrative       Family History   Problem Relation Age of Onset   • Diabetes Mother    • Heart disease Mother        REVIEW OF SYSTEMS:   All systems reviewed.  Pertinent positives identified in HPI.  All other systems are negative.      Objective:     Vitals:    03/15/17 0303 03/15/17 0402 03/15/17 0505 03/15/17 0602   BP: 119/76 125/85 136/86 135/79   BP Location: Right arm   Right arm   Patient Position: Lying   Lying   Pulse: 67 66 68 63   Resp: 18   18   Temp: 98.6 °F (37 °C)   97.9 °F (36.6 °C)   TempSrc: Oral   Oral   SpO2: 94% 94% 98% 99%   Weight:    168 lb 7 oz (76.4 kg)   Height:         Body mass index is 25.61 kg/(m^2).    Physical Exam:  General Appearance:    Alert, cooperative, in no acute distress   Head:    Normocephalic, without obvious abnormality, atraumatic   Eyes:            Lids and lashes normal, conjunctivae and sclerae normal, no   icterus, no pallor, corneas clear, PERRLA   Ears:    Ears appear intact with no abnormalities noted   Throat:   No oral lesions, no thrush, oral mucosa moist   Neck:   No adenopathy, supple, trachea midline, no thyromegaly, no   carotid bruit, no JVD   Back:     No kyphosis present, no scoliosis present, no skin lesions, erythema or scars, no tenderness to percussion or palpation, range of motion normal   Lungs:     Clear to auscultation,respirations regular, even and unlabored    Heart:    Regular rhythm and normal rate, normal S1 and S2, no murmur, no gallop, no rub, no click   Chest Wall:    No abnormalities observed    Abdomen:     Normal bowel sounds, no masses, no organomegaly, soft        non-tender, non-distended, no guarding, no rebound  tenderness   Extremities:   Moves all extremities well, no edema, no cyanosis, no redness   Pulses:   Pulses palpable and equal bilaterally. Normal radial, carotid, femoral, dorsalis pedis and posterior tibial pulses bilaterally. Normal abdominal aorta   Skin:  Psychiatric:   No bleeding, bruising or rash    Alert and oriented x 3, normal mood and affect         Lab Review:       Results from last 7 days  Lab Units 03/15/17  0633 03/14/17  0933   SODIUM mmol/L 136 140   POTASSIUM mmol/L 3.6 3.9   CHLORIDE mmol/L 98 101   TOTAL CO2 mmol/L 27.6 25.8   BUN mg/dL 16 19   CREATININE mg/dL 0.89 1.04   CALCIUM mg/dL 8.1* 9.4   BILIRUBIN mg/dL  --  0.4   ALK PHOS U/L  --  68   ALT (SGPT) U/L  --  17   AST (SGOT) U/L  --  14   GLUCOSE mg/dL 162* 206*       Results from last 7 days  Lab Units 03/15/17  0633 03/15/17  0000 03/14/17  1801   TROPONIN T ng/mL <0.010 <0.010 <0.010       Results from last 7 days  Lab Units 03/15/17  0633   WBC 10*3/mm3 7.28   HEMOGLOBIN g/dL 11.3*   HEMATOCRIT % 33.6*   PLATELETS 10*3/mm3 197                           I personally viewed and interpreted the patient's EKG/Telemetry data.              Current Facility-Administered Medications:   •  albuterol (PROVENTIL) nebulizer solution 0.083% 2.5 mg/3mL, 2.5 mg, Nebulization, Q4H PRN, Sharita Hill DO  •  amLODIPine (NORVASC) tablet 5 mg, 5 mg, Oral, Q24H, Contreras Rodriguez MD, 5 mg at 03/14/17 1840  •  budesonide-formoterol (SYMBICORT) 160-4.5 MCG/ACT inhaler 2 puff, 2 puff, Inhalation, BID - RT, Sharita Hill DO, 2 puff at 03/15/17 0923  •  enoxaparin (LOVENOX) syringe 40 mg, 40 mg, Subcutaneous, Q24H, Sharita Hill DO, 40 mg at 03/14/17 1752  •  furosemide (LASIX) tablet 40 mg, 40 mg, Oral, Daily, Contreras Rodriguez MD, 40 mg at 03/15/17 0904  •  glipiZIDE (GLUCOTROL) tablet 10 mg, 10 mg,  Oral, BID, Sharita Hlil DO, 10 mg at 03/15/17 0904  •  HYDROmorphone (DILAUDID) injection 0.5 mg, 0.5 mg, Intravenous, Q2H PRN, Contreras Rodriguez MD, 0.5 mg at 03/15/17 0607  •  ipratropium-albuterol (DUO-NEB) nebulizer solution 3 mL, 3 mL, Nebulization, Q4H PRN, Contreras Rodriguez MD  •  isosorbide mononitrate (IMDUR) 24 hr tablet 60 mg, 60 mg, Oral, Daily, Sharita Hill DO, 60 mg at 03/15/17 0904  •  metFORMIN (GLUCOPHAGE) tablet 1,000 mg, 1,000 mg, Oral, BID With Meals, Sharita Hill DO, 1,000 mg at 03/14/17 1800  •  metoprolol succinate XL (TOPROL-XL) 24 hr tablet 50 mg, 50 mg, Oral, Daily, Sharita Hill DO, 50 mg at 03/15/17 0904  •  morphine injection 2 mg, 2 mg, Intravenous, Q6H PRN, Contreras Rodriguez MD, 2 mg at 03/14/17 2223  •  nitroglycerin (NITROSTAT) SL tablet 0.4 mg, 0.4 mg, Sublingual, Q5 Min PRN, Sharita Hill DO  •  nitroglycerin infusion 200 mcg/mL, 10-50 mcg/min, Intravenous, Titrated, Justin Harper MD  •  oxyCODONE-acetaminophen (PERCOCET)  MG per tablet 1 tablet, 1 tablet, Oral, Q4H PRN, Sharita Hill DO, 1 tablet at 03/15/17 0900  •  pantoprazole (PROTONIX) injection 40 mg, 40 mg, Intravenous, Q12H, Contreras Rodriguez MD, 40 mg at 03/15/17 0904  •  rOPINIRole (REQUIP) tablet 4 mg, 4 mg, Oral, Nightly, Sharita Hill DO, 4 mg at 03/14/17 2005  •  sodium chloride 0.9 % flush 1-10 mL, 1-10 mL, Intravenous, PRN, Sharita Hill DO  •  Insert peripheral IV, , , Once **AND** sodium chloride 0.9 % flush 10 mL, 10 mL, Intravenous, PRN, Justin Harper MD  •  spironolactone (ALDACTONE) tablet 12.5 mg, 12.5 mg, Oral, Daily, Sharita Hill DO, 12.5 mg at 03/14/17 0286      Assessment and Plan:       Principal Problem:    Abdominal pain  Active Problems:    Chest pain    HTN (hypertension)    CAD (coronary artery disease)    Ischemic cardiomyopathy    Hyperlipidemia    Sleep apnea    SOB (shortness of breath)    I would  recommend that we resume his home medical regimen.  An echocardiogram was ordered on admission and has apparently already been performed; I'll review those images once they're downloaded into the system.  From my standpoint he can be discharged home to follow-up with Dr. Roberts.    Chance Page III, MD  03/15/17  8:42 AM

## 2017-03-24 ENCOUNTER — HOSPITAL ENCOUNTER (EMERGENCY)
Facility: HOSPITAL | Age: 62
Discharge: HOME | End: 2017-03-24
Attending: EMERGENCY MEDICINE | Admitting: EMERGENCY MEDICINE

## 2017-03-24 ENCOUNTER — APPOINTMENT (OUTPATIENT)
Dept: CT IMAGING | Facility: HOSPITAL | Age: 62
End: 2017-03-24
Attending: EMERGENCY MEDICINE

## 2017-03-24 VITALS
BODY MASS INDEX: 26.67 KG/M2 | OXYGEN SATURATION: 96 % | HEIGHT: 68 IN | DIASTOLIC BLOOD PRESSURE: 98 MMHG | TEMPERATURE: 98.1 F | WEIGHT: 176 LBS | HEART RATE: 91 BPM | RESPIRATION RATE: 16 BRPM | SYSTOLIC BLOOD PRESSURE: 146 MMHG

## 2017-03-24 DIAGNOSIS — N23 RENAL COLIC ON LEFT SIDE: Primary | ICD-10-CM

## 2017-03-24 DIAGNOSIS — R10.12 LEFT UPPER QUADRANT PAIN: ICD-10-CM

## 2017-03-24 LAB
ALBUMIN SERPL-MCNC: 4.2 G/DL (ref 3.5–5.2)
ALBUMIN/GLOB SERPL: 1.3 G/DL
ALP SERPL-CCNC: 62 U/L (ref 40–129)
ALT SERPL W P-5'-P-CCNC: 8 U/L (ref 5–41)
AMYLASE SERPL-CCNC: 42 U/L (ref 28–100)
ANION GAP SERPL CALCULATED.3IONS-SCNC: 13.4 MMOL/L
AST SERPL-CCNC: 10 U/L (ref 5–40)
BACTERIA UR QL AUTO: ABNORMAL /HPF
BASOPHILS # BLD AUTO: 0.04 10*3/MM3 (ref 0–0.2)
BASOPHILS NFR BLD AUTO: 0.4 % (ref 0–2)
BILIRUB SERPL-MCNC: 0.6 MG/DL (ref 0.2–1.2)
BILIRUB UR QL STRIP: NEGATIVE
BUN BLD-MCNC: 13 MG/DL (ref 8–23)
BUN/CREAT SERPL: 14.8 (ref 7–25)
CALCIUM SPEC-SCNC: 9.3 MG/DL (ref 8.8–10.5)
CHLORIDE SERPL-SCNC: 98 MMOL/L (ref 98–107)
CLARITY UR: CLEAR
CO2 SERPL-SCNC: 24.6 MMOL/L (ref 22–29)
COLOR UR: YELLOW
CREAT BLD-MCNC: 0.88 MG/DL (ref 0.76–1.27)
DEPRECATED RDW RBC AUTO: 49.6 FL (ref 37–54)
EOSINOPHIL # BLD AUTO: 0.11 10*3/MM3 (ref 0.1–0.3)
EOSINOPHIL NFR BLD AUTO: 1.2 % (ref 0–4)
ERYTHROCYTE [DISTWIDTH] IN BLOOD BY AUTOMATED COUNT: 16.1 % (ref 11.5–14.5)
GFR SERPL CREATININE-BSD FRML MDRD: 87 ML/MIN/1.73
GLOBULIN UR ELPH-MCNC: 3.2 GM/DL
GLUCOSE BLD-MCNC: 173 MG/DL (ref 65–99)
GLUCOSE UR STRIP-MCNC: NEGATIVE MG/DL
HCT VFR BLD AUTO: 42.3 % (ref 42–52)
HGB BLD-MCNC: 14.3 G/DL (ref 14–18)
HGB UR QL STRIP.AUTO: NEGATIVE
HYALINE CASTS UR QL AUTO: ABNORMAL /LPF
IMM GRANULOCYTES # BLD: 0.02 10*3/MM3 (ref 0–0.03)
IMM GRANULOCYTES NFR BLD: 0.2 % (ref 0–0.5)
KETONES UR QL STRIP: ABNORMAL
LEUKOCYTE ESTERASE UR QL STRIP.AUTO: NEGATIVE
LIPASE SERPL-CCNC: 18 U/L (ref 13–60)
LYMPHOCYTES # BLD AUTO: 1.63 10*3/MM3 (ref 0.6–4.8)
LYMPHOCYTES NFR BLD AUTO: 18.3 % (ref 20–45)
MCH RBC QN AUTO: 28.5 PG (ref 27–31)
MCHC RBC AUTO-ENTMCNC: 33.8 G/DL (ref 31–37)
MCV RBC AUTO: 84.3 FL (ref 80–94)
MONOCYTES # BLD AUTO: 0.79 10*3/MM3 (ref 0–1)
MONOCYTES NFR BLD AUTO: 8.8 % (ref 3–8)
MUCOUS THREADS URNS QL MICRO: ABNORMAL /HPF
NEUTROPHILS # BLD AUTO: 6.34 10*3/MM3 (ref 1.5–8.3)
NEUTROPHILS NFR BLD AUTO: 71.1 % (ref 45–70)
NITRITE UR QL STRIP: NEGATIVE
NRBC BLD MANUAL-RTO: 0 /100 WBC (ref 0–0)
PH UR STRIP.AUTO: 6.5 [PH] (ref 4.5–8)
PLATELET # BLD AUTO: 231 10*3/MM3 (ref 140–500)
PMV BLD AUTO: 10 FL (ref 7.4–10.4)
POTASSIUM BLD-SCNC: 3.6 MMOL/L (ref 3.5–5.2)
PROT SERPL-MCNC: 7.4 G/DL (ref 6–8.5)
PROT UR QL STRIP: ABNORMAL
RBC # BLD AUTO: 5.02 10*6/MM3 (ref 4.7–6.1)
RBC # UR: ABNORMAL /HPF
REF LAB TEST METHOD: ABNORMAL
SODIUM BLD-SCNC: 136 MMOL/L (ref 136–145)
SP GR UR STRIP: 1.02 (ref 1–1.03)
SQUAMOUS #/AREA URNS HPF: ABNORMAL /HPF
UROBILINOGEN UR QL STRIP: ABNORMAL
WBC NRBC COR # BLD: 8.93 10*3/MM3 (ref 4.8–10.8)
WBC UR QL AUTO: ABNORMAL /HPF

## 2017-03-24 PROCEDURE — 99283 EMERGENCY DEPT VISIT LOW MDM: CPT

## 2017-03-24 PROCEDURE — 85025 COMPLETE CBC W/AUTO DIFF WBC: CPT | Performed by: EMERGENCY MEDICINE

## 2017-03-24 PROCEDURE — 25010000002 HYDROMORPHONE PER 4 MG: Performed by: EMERGENCY MEDICINE

## 2017-03-24 PROCEDURE — 25010000002 MORPHINE PER 10 MG: Performed by: EMERGENCY MEDICINE

## 2017-03-24 PROCEDURE — 83690 ASSAY OF LIPASE: CPT | Performed by: EMERGENCY MEDICINE

## 2017-03-24 PROCEDURE — 25010000002 ONDANSETRON PER 1 MG: Performed by: EMERGENCY MEDICINE

## 2017-03-24 PROCEDURE — 80053 COMPREHEN METABOLIC PANEL: CPT | Performed by: EMERGENCY MEDICINE

## 2017-03-24 PROCEDURE — 96376 TX/PRO/DX INJ SAME DRUG ADON: CPT

## 2017-03-24 PROCEDURE — 96374 THER/PROPH/DIAG INJ IV PUSH: CPT

## 2017-03-24 PROCEDURE — 81001 URINALYSIS AUTO W/SCOPE: CPT | Performed by: EMERGENCY MEDICINE

## 2017-03-24 PROCEDURE — 74176 CT ABD & PELVIS W/O CONTRAST: CPT

## 2017-03-24 PROCEDURE — 82150 ASSAY OF AMYLASE: CPT | Performed by: EMERGENCY MEDICINE

## 2017-03-24 PROCEDURE — 96375 TX/PRO/DX INJ NEW DRUG ADDON: CPT

## 2017-03-24 PROCEDURE — 99284 EMERGENCY DEPT VISIT MOD MDM: CPT | Performed by: EMERGENCY MEDICINE

## 2017-03-24 RX ORDER — SODIUM CHLORIDE 0.9 % (FLUSH) 0.9 %
10 SYRINGE (ML) INJECTION AS NEEDED
Status: DISCONTINUED | OUTPATIENT
Start: 2017-03-24 | End: 2017-03-24 | Stop reason: HOSPADM

## 2017-03-24 RX ORDER — ONDANSETRON 4 MG/1
4 TABLET, ORALLY DISINTEGRATING ORAL EVERY 6 HOURS PRN
Qty: 20 TABLET | Refills: 0 | Status: SHIPPED | OUTPATIENT
Start: 2017-03-24

## 2017-03-24 RX ORDER — ONDANSETRON 2 MG/ML
4 INJECTION INTRAMUSCULAR; INTRAVENOUS ONCE
Status: COMPLETED | OUTPATIENT
Start: 2017-03-24 | End: 2017-03-24

## 2017-03-24 RX ADMIN — MORPHINE SULFATE 4 MG: 4 INJECTION, SOLUTION INTRAMUSCULAR; INTRAVENOUS at 10:54

## 2017-03-24 RX ADMIN — HYDROMORPHONE HYDROCHLORIDE 1 MG: 1 INJECTION, SOLUTION INTRAMUSCULAR; INTRAVENOUS; SUBCUTANEOUS at 13:17

## 2017-03-24 RX ADMIN — ONDANSETRON 4 MG: 2 INJECTION, SOLUTION INTRAMUSCULAR; INTRAVENOUS at 10:52

## 2017-03-24 RX ADMIN — HYDROMORPHONE HYDROCHLORIDE 1 MG: 1 INJECTION, SOLUTION INTRAMUSCULAR; INTRAVENOUS; SUBCUTANEOUS at 12:16

## 2017-03-24 NOTE — ED PROVIDER NOTES
Subjective   Patient is a 61 y.o. male presenting with abdominal pain.   History provided by:  Patient  Abdominal Pain   Pain location:  Generalized  Pain radiates to:  L flank  Pain severity:  Severe  Onset quality:  Gradual  Timing:  Constant  Progression:  Worsening  Chronicity:  Recurrent  Context comment:  As below.  Relieved by:  Nothing  Worsened by:  Movement  Ineffective treatments:  None tried  Associated symptoms: nausea    Associated symptoms: no chest pain, no chills, no constipation, no diarrhea, no dysuria, no fever, no hematuria, no shortness of breath, no sore throat and no vomiting    Risk factors: recent hospitalization      HPI Narrative:Mr Giron is a 60 yo WM who was in secondary to abdominal pain onset last night. Patient was hospitalized 3/14/17-3/15/17 secondary to chest pain and abdominal pain.  Patient reports a history of chronic pancreatitis with frequent flareups.  He feels this is the etiology of his pain.  Associated nausea.  No vomiting or diarrhea reported.    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  He was also found to have chronic pancreatitis.  However  No acute etiology was found for patient's pain.  He turns his pain to pancreatitis.  He presents for evaluation.        Review of Systems   Constitutional: Negative for chills, diaphoresis and fever.   HENT: Negative for congestion, ear pain and sore throat.    Eyes: Negative for pain and discharge.   Respiratory: Negative for chest tightness, shortness of breath, wheezing and stridor.    Cardiovascular: Negative for chest pain, palpitations and leg swelling.   Gastrointestinal: Positive for abdominal pain and nausea. Negative for constipation, diarrhea and vomiting.   Genitourinary: Negative for dysuria, flank pain, frequency and hematuria.   Musculoskeletal: Negative for back pain, myalgias, neck pain and neck stiffness.   Skin: Negative for color  change, pallor and rash.   Neurological: Negative for dizziness, seizures, syncope and headaches.   Psychiatric/Behavioral: Negative for agitation and confusion. The patient is not nervous/anxious.    All other systems reviewed and are negative.      Past Medical History:   Diagnosis Date   • Arthritis    • CAD (coronary artery disease)    • Chest pain    • COPD (chronic obstructive pulmonary disease)    • Diabetes    • Heart attack    • Heart disease    • HTN (hypertension)    • Hyperlipidemia    • Ischemic cardiomyopathy    • Pancreatitis    • Sleep apnea        No Known Allergies    Past Surgical History:   Procedure Laterality Date   • CORONARY ANGIOPLASTY WITH STENT PLACEMENT     • CORONARY ARTERY BYPASS GRAFT     • HEAD/NECK ABSCESS INCISION AND DRAINAGE N/A 3/17/2016    Procedure: HEAD NECK ABSCESS INCISION AND DRAINAGE-POSTERIOR NECK with cultures;  Surgeon: Liz Hernandez DO;  Location: [facility] OR;  Service:    • INCISION AND DRAINAGE ABSCESS         Family History   Problem Relation Age of Onset   • Diabetes Mother    • Heart disease Mother        Social History     Social History   • Marital status:      Spouse name: N/A   • Number of children: N/A   • Years of education: N/A     Social History Main Topics   • Smoking status: Current Every Day Smoker     Packs/day: 1.00     Years: 50.00   • Smokeless tobacco: None   • Alcohol use No   • Drug use: No   • Sexual activity: Defer     Other Topics Concern   • None     Social History Narrative           Objective   Physical Exam   Constitutional: He is oriented to person, place, and time. He appears well-developed and well-nourished. No distress.   60 yo WM lying in bed. He is moaning with every exhalation.  However he stops moaning at my request to allow auscultation of heart and lungs.  Patient is mildly hypertensive but heart rate and respiratory rate are normal. In case his eyes closed throughout history except on eye exam.  He appears in good  health. Accompanied by spouse.    HENT:   Head: Normocephalic and atraumatic.   Right Ear: External ear normal.   Left Ear: External ear normal.   Nose: Nose normal.   Mouth/Throat: Oropharynx is clear and moist.   Eyes: Conjunctivae and EOM are normal. Pupils are equal, round, and reactive to light.   Neck: Normal range of motion. Neck supple.   Cardiovascular: Normal rate, regular rhythm, normal heart sounds and intact distal pulses.  Exam reveals no gallop and no friction rub.    No murmur heard.  Pulmonary/Chest: Effort normal and breath sounds normal. No stridor. No respiratory distress. He has no wheezes. He has no rales.   Abdominal: Soft. He exhibits no distension. There is no hepatosplenomegaly. There is generalized tenderness and tenderness in the left upper quadrant. There is no rigidity, no rebound, no tenderness at McBurney's point and negative Fernando's sign. No hernia.   Musculoskeletal: Normal range of motion. He exhibits no edema.   Neurological: He is alert and oriented to person, place, and time. No cranial nerve deficit.   Skin: Skin is warm and dry. No rash noted. He is not diaphoretic. No erythema.   Psychiatric: He has a normal mood and affect. His behavior is normal.   Nursing note and vitals reviewed.      Procedures           ED Course  ED Course   Comment By Time   03/24/17  11:04 AM  Lab work only notable for glucose 173.  Amylase and lipase are normal.  CBC normal.  Awaiting CT.  Patient given morphine and Zofran for pain. Sebastien Carroll MD 03/24 1108   03/24/17  11:57 AM  Patient continues to moan in pain with each expiration.  He reports minimal improvement with morphine.  Will give additional pain medications.  Will obtain CT abdomen and pelvis.     Review of EMR shows on patient's recent hospitalization he had evidence of chronic pancreatitis but not acute. Sebastien Carroll MD 03/24 6688   03/24/17  12:57 PM  Pt's work up show only chronic pancreatitis, diverticulosis w/o  "inflammation and 5 mm left renal stone.   Patient reports his pain is more left-sided.  He is again asking for pain medication.  Patient now reports he is in pain management.  He takes Percocet daily for back and shoulder pain.  However he says Percocet \"aren't touching this pain\".  I have explained to patient that I will not prescribe stronger pain medications As I find no evidence of acute intra-abdominal process.  Also in light of his ongoing care by pain management.  I will give 1 additional dose of pain medication prior to discharge.  Unfortunately patient's behavior is concerning for drug-seeking Especially in light of no definitive etiology found for patient's abdominal pain and his repeated requests for pain medication. Sebastien Carroll MD 03/24 1257      Labs Reviewed   COMPREHENSIVE METABOLIC PANEL - Abnormal; Notable for the following:        Result Value    Glucose 173 (*)     All other components within normal limits   URINALYSIS W/ CULTURE IF INDICATED - Abnormal; Notable for the following:     Ketones, UA Trace (*)     Protein,  mg/dL (2+) (*)     All other components within normal limits   CBC WITH AUTO DIFFERENTIAL - Abnormal; Notable for the following:     RDW 16.1 (*)     Neutrophil % 71.1 (*)     Lymphocyte % 18.3 (*)     Monocyte % 8.8 (*)     All other components within normal limits   URINALYSIS, MICROSCOPIC ONLY - Abnormal; Notable for the following:     RBC, UA 0-2 (*)     WBC, UA 0-2 (*)     Bacteria, UA Trace (*)     All other components within normal limits   AMYLASE - Normal   LIPASE - Normal   CBC AND DIFFERENTIAL    Narrative:     The following orders were created for panel order CBC & Differential.  Procedure                               Abnormality         Status                     ---------                               -----------         ------                     CBC Auto Differential[[id number]]         Abnormal            Final result                 Please view results " for these tests on the individual orders.       Ct Abdomen Pelvis Without Contrast    Result Date: 3/24/2017  Narrative: EXAM: CT abdomen and pelvis without contrast.  DATE: 03/24/2017  HISTORY: Generalized abdominal pain today. Previous history of pancreatitis.  COMPARISON: Complete abdominal ultrasound 03/15/2017. CT abdomen pelvis 03/14/2017.  PROCEDURE: 3 mm noncontrast axial images through the abdomen and pelvis. Enteric contrast was not administered. Sagittal and coronal reformatted images were obtained.  Radiation dose reduction techniques were utilized including automated exposure control and exposure modulation based on body size.  Abdomen findings:  Nonobstructing left renal stone measures 5 mm. No ureteral calculus or hydronephrosis. Cysts projecting at within the right lower renal pole measures 1.9 cm  Noncontrast appearance of the liver, gallbladder, spleen, adrenals within normal limits.  FINDINGS of chronic calcific pancreatitis without evidence of acute pancreatic inflammatory change. No free fluid. No pseudocysts.  Lung bases are free of consolidation.  Unopacified bowel appears nonthickened and noninflamed. Appendix is normal.  Pelvis findings:  Urinary bladder, prostate and rectum are within normal limits. No pelvic adenopathy or free fluid. Sparse colonic diverticulosis.      Impression: 1. No acute findings in the abdomen or pelvis. No CT explanation for the patient's pain. 2. Chronic calcific pancreatitis. No evidence of acute pancreatitis. 3. Nonobstructing left renal stone. Right renal cyst. 4. Sparse uncomplicated colonic diverticulosis.  This report was finalized on 3/24/2017 12:10 PM by Dr. Muriel Olmstead MD.      Us Abdomen Complete    Result Date: 3/15/2017  Narrative: ULTRASOUND ABDOMEN, COMPLETE, 03/15/2017:  HISTORY: 61-year-old male admitted to the ED yesterday with chest pain and elevated blood pressure. He has a history of pancreatitis. Complains of abdomen pain beginning yesterday  morning.  TECHNIQUE: Grayscale ultrasound imaging of the upper abdomen.  COMPARISON: *  CT abdomen/pelvis, 03/14/2017.  FINDINGS: Gallbladder is negative. No visible cholelithiasis or wall thickening. No intrahepatic or extra hepatic bile duct dilatation (CBD 5 mm). Liver and spleen are normal in size and appearance.  Parenchymal calcifications are visible within the visualized mid body of the pancreas compatible with chronic calcific pancreatitis. Pancreas was better visualized on CT yesterday. Abdominal aorta is normal in caliber, and the intrahepatic IVC is patent.  Small nonobstructing calculus in the left mid kidney. Small benign cyst arising from the surface of the right mid kidney. No evidence of urinary obstruction. Left lower pole renal infarct visible on CT yesterday is not appreciable on ultrasound. Tiny bilateral pleural effusions are visible.      Impression: 1. Normal gallbladder. No cholelithiasis or bile duct dilatation. 2. Chronic calcific pancreatitis. 3. Nonobstructing left renal stone. Small benign right renal cyst. 4. Tiny bilateral pleural effusions.  This report was finalized on 3/15/2017 2:12 PM by Dr. Andrade Rodriguez MD.      Ct Abdomen Pelvis With Contrast    Result Date: 3/14/2017  Narrative: CT ABDOMEN AND PELVIS WITH CONTRAST, 03/14/2017:  HISTORY: 60-year-old male in the ED complaining of epigastric pain and left upper quadrant abdomen pain with nausea beginning earlier this morning. The patient notes a history of intermittent pancreatitis over the prior five years. Lipase levels are currently normal.  TECHNIQUE: CT examination of the abdomen and pelvis with IV contrast. GI contrast was not ordered. Radiation dose reduction techniques were utilized, including automated exposure control and exposure modulation based on body size.  LOWER CHEST FINDINGS: Lung base images show mild interstitial edema and small bilateral pleural effusions suggesting vascular congestion. Clinical  correlation recommended. No pericardial effusion.  ABDOMEN FINDINGS: Diffuse chronic calcific pancreatitis, but no evidence of acute pancreatitis at this time. Nondistended gallbladder. No bile duct or pancreatic duct dilatation. Liver and spleen are normal in size and appearance.  There is a small subacute or chronic infarct in the lower pole left kidney, not present on a CTA examination of 08/09/2013. Nonobstructing 7 mm left mid renal calculus. Small right lower renal cyst. No evidence of urinary obstruction. Normal caliber abdominal aorta.  Mild sigmoid diverticulosis. Small bowel and colon are otherwise normal in caliber and appearance, as imaged. The appendix is normal.  PELVIS FINDINGS: Urinary bladder, prostate and rectum are within normal limits.      Impression: 1. Lung base images showing vascular congestion with mild interstitial edema and small bilateral pleural effusions. 2. Small subacute or chronic left lower pole renal infarct. 3. Chronic calcific pancreatitis. No CT evidence of acute pancreatitis at this time. 4. Mild diverticulosis. Normal appendix. 5. Nonobstructing left mid renal calculus.  This report was finalized on 3/14/2017 11:11 AM by Dr. Andrade Rodriguez MD.      Xr Chest 1 View    Result Date: 3/14/2017  Narrative: CHEST X-RAY, 03/14/2017:  HISTORY: 61-year-old male in the ED complaining of read a history of chest pain and shortness of air.  TECHNIQUE: AP portable upright chest x-ray.  FINDINGS: Mild cardiomegaly is stable. Pulmonary venous redistribution, mild diffuse interstitial edema and tiny bilateral pleural effusions suggesting mild vascular congestion. This is similar when compared with the previous study of 07/14/2016. Prior CABG. Cardiac pacer/ICD.      Impression: 1. Mild vascular congestion with diffuse interstitial edema and tiny pleural effusions. Similar findings were present on 07/14/2016. 2. Stable mild cardiomegaly. CABG.  This report was finalized on 3/14/2017 12:26  PM by Dr. Andrade Rodriguez MD.        My differential diagnosis for abdominal pain includes but is not limited to:  Gastritis, gastroenteritis, peptic ulcer disease, GERD, esophageal perforation, acute appendicitis, mesenteric adenitis, Meckel’s diverticulum, epiploic appendagitis, diverticulitis, colon cancer, ulcerative colitis, Crohn’s disease, intussusception, small bowel obstruction, adhesions, ischemic bowel, perforated viscus, ileus, obstipation, biliary colic, cholecystitis, cholelithiasis, Gil-Umesh Dheeraj, hepatitis, pancreatitis, common bile duct obstruction, cholangitis, bile leak, splenic trauma, splenic rupture, splenic infarction, splenic abscess, abdominal abscess, ascites, spontaneous bacterial peritonitis, hernia, UTI, cystitis, prostatitis, ureterolithiasis, urinary obstruction, ovarian cyst, torsion, pregnancy, ectopic pregnancy, PID, pelvic abscess, mittelschmerz, endometriosis, AAA, myocardial infarction, pneumonia, cancer, porphyria, DKA, medications, sickle cell, viral syndrome, zoster          MDM  Number of Diagnoses or Management Options  Left upper quadrant pain: new and requires workup  Renal colic on left side: new and requires workup     Amount and/or Complexity of Data Reviewed  Clinical lab tests: ordered and reviewed  Tests in the radiology section of CPT®: ordered and reviewed  Obtain history from someone other than the patient: yes (Spouse provides some history.)  Review and summarize past medical records: yes    Risk of Complications, Morbidity, and/or Mortality  Presenting problems: moderate  Diagnostic procedures: moderate  Management options: low    Patient Progress  Patient progress: improved      Final diagnoses:   Renal colic on left side   Left upper quadrant pain            Sebastien Carroll MD  03/24/17 [id number]       Sebastien Carroll MD  03/24/17 1829

## 2017-03-24 NOTE — DISCHARGE INSTRUCTIONS
Continue current medications.  Follow-up with Dr. Bird urology for further evaluation of kidney stone.  With PMD in pain management as above.  Return to ED for medical emergencies.

## 2017-03-29 ENCOUNTER — HOSPITAL ENCOUNTER (EMERGENCY)
Facility: HOSPITAL | Age: 62
Discharge: HOME | End: 2017-03-29
Attending: EMERGENCY MEDICINE | Admitting: EMERGENCY MEDICINE

## 2017-03-29 VITALS
BODY MASS INDEX: 26.37 KG/M2 | WEIGHT: 174 LBS | HEIGHT: 68 IN | DIASTOLIC BLOOD PRESSURE: 85 MMHG | RESPIRATION RATE: 19 BRPM | SYSTOLIC BLOOD PRESSURE: 150 MMHG | OXYGEN SATURATION: 92 % | HEART RATE: 97 BPM | TEMPERATURE: 97.9 F

## 2017-03-29 DIAGNOSIS — R10.12 RECURRENT LEFT UPPER QUADRANT ABDOMINAL PAIN: Primary | ICD-10-CM

## 2017-03-29 DIAGNOSIS — R52 UNCONTROLLED PAIN: ICD-10-CM

## 2017-03-29 LAB
ALBUMIN SERPL-MCNC: 4.1 G/DL (ref 3.5–5.2)
ALBUMIN/GLOB SERPL: 1.4 G/DL
ALP SERPL-CCNC: 65 U/L (ref 40–129)
ALT SERPL W P-5'-P-CCNC: 11 U/L (ref 5–41)
ANION GAP SERPL CALCULATED.3IONS-SCNC: 13.7 MMOL/L
AST SERPL-CCNC: 15 U/L (ref 5–40)
BASOPHILS # BLD AUTO: 0.03 10*3/MM3 (ref 0–0.2)
BASOPHILS NFR BLD AUTO: 0.3 % (ref 0–2)
BILIRUB SERPL-MCNC: 0.5 MG/DL (ref 0.2–1.2)
BUN BLD-MCNC: 10 MG/DL (ref 8–23)
BUN/CREAT SERPL: 11.1 (ref 7–25)
CALCIUM SPEC-SCNC: 9.1 MG/DL (ref 8.8–10.5)
CHLORIDE SERPL-SCNC: 102 MMOL/L (ref 98–107)
CO2 SERPL-SCNC: 26.3 MMOL/L (ref 22–29)
CREAT BLD-MCNC: 0.9 MG/DL (ref 0.76–1.27)
DEPRECATED RDW RBC AUTO: 51.7 FL (ref 37–54)
EOSINOPHIL # BLD AUTO: 0.07 10*3/MM3 (ref 0.1–0.3)
EOSINOPHIL NFR BLD AUTO: 0.7 % (ref 0–4)
ERYTHROCYTE [DISTWIDTH] IN BLOOD BY AUTOMATED COUNT: 16.4 % (ref 11.5–14.5)
GFR SERPL CREATININE-BSD FRML MDRD: 85 ML/MIN/1.73
GLOBULIN UR ELPH-MCNC: 3 GM/DL
GLUCOSE BLD-MCNC: 206 MG/DL (ref 65–99)
HCT VFR BLD AUTO: 40.3 % (ref 42–52)
HGB BLD-MCNC: 13.5 G/DL (ref 14–18)
IMM GRANULOCYTES # BLD: 0.02 10*3/MM3 (ref 0–0.03)
IMM GRANULOCYTES NFR BLD: 0.2 % (ref 0–0.5)
LIPASE SERPL-CCNC: 43 U/L (ref 13–60)
LYMPHOCYTES # BLD AUTO: 1.1 10*3/MM3 (ref 0.6–4.8)
LYMPHOCYTES NFR BLD AUTO: 11.8 % (ref 20–45)
MCH RBC QN AUTO: 29 PG (ref 27–31)
MCHC RBC AUTO-ENTMCNC: 33.5 G/DL (ref 31–37)
MCV RBC AUTO: 86.5 FL (ref 80–94)
MONOCYTES # BLD AUTO: 0.57 10*3/MM3 (ref 0–1)
MONOCYTES NFR BLD AUTO: 6.1 % (ref 3–8)
NEUTROPHILS # BLD AUTO: 7.56 10*3/MM3 (ref 1.5–8.3)
NEUTROPHILS NFR BLD AUTO: 80.9 % (ref 45–70)
NRBC BLD MANUAL-RTO: 0 /100 WBC (ref 0–0)
PLATELET # BLD AUTO: 210 10*3/MM3 (ref 140–500)
PMV BLD AUTO: 10.1 FL (ref 7.4–10.4)
POTASSIUM BLD-SCNC: 4.3 MMOL/L (ref 3.5–5.2)
PROT SERPL-MCNC: 7.1 G/DL (ref 6–8.5)
RBC # BLD AUTO: 4.66 10*6/MM3 (ref 4.7–6.1)
SODIUM BLD-SCNC: 142 MMOL/L (ref 136–145)
TROPONIN T SERPL-MCNC: <0.01 NG/ML (ref 0–0.03)
WBC NRBC COR # BLD: 9.35 10*3/MM3 (ref 4.8–10.8)

## 2017-03-29 PROCEDURE — 93010 ELECTROCARDIOGRAM REPORT: CPT | Performed by: INTERNAL MEDICINE

## 2017-03-29 PROCEDURE — 99283 EMERGENCY DEPT VISIT LOW MDM: CPT

## 2017-03-29 PROCEDURE — 84484 ASSAY OF TROPONIN QUANT: CPT | Performed by: EMERGENCY MEDICINE

## 2017-03-29 PROCEDURE — 99282 EMERGENCY DEPT VISIT SF MDM: CPT | Performed by: EMERGENCY MEDICINE

## 2017-03-29 PROCEDURE — 80053 COMPREHEN METABOLIC PANEL: CPT | Performed by: EMERGENCY MEDICINE

## 2017-03-29 PROCEDURE — 83690 ASSAY OF LIPASE: CPT | Performed by: EMERGENCY MEDICINE

## 2017-03-29 PROCEDURE — 25010000002 HYDROMORPHONE PER 4 MG: Performed by: EMERGENCY MEDICINE

## 2017-03-29 PROCEDURE — 93005 ELECTROCARDIOGRAM TRACING: CPT | Performed by: EMERGENCY MEDICINE

## 2017-03-29 PROCEDURE — 85025 COMPLETE CBC W/AUTO DIFF WBC: CPT | Performed by: EMERGENCY MEDICINE

## 2017-03-29 PROCEDURE — 96374 THER/PROPH/DIAG INJ IV PUSH: CPT

## 2017-03-29 RX ADMIN — HYDROMORPHONE HYDROCHLORIDE 2 MG: 1 INJECTION, SOLUTION INTRAMUSCULAR; INTRAVENOUS; SUBCUTANEOUS at 21:20

## 2017-03-30 NOTE — ED PROVIDER NOTES
"Subjective   History of Present Illness  History of Present Illness    Chief complaint: Abdominal pain    Location: LUQ    Quality/Severity:  severe    Timing/Duration: \"5 years\"    Modifying Factors: \"percocet doesn't touch this\"    Associated Symptoms: denies CP, Dyspnea, N/V/D, dysuria, hematuria, change in bowel habits    Narrative: 62 yo male reports 5 yr hx of LUQ pain that has been thoroughly and repeatedly evaluated without identified acute pathology but carries the diagnosis of chronic pancreatitis presents to the ED requesting assistance c pain relief.    Review of Systems  All other systems reviewed and are otherwise negative  Past Medical History:   Diagnosis Date   • Arthritis    • CAD (coronary artery disease)    • Chest pain    • COPD (chronic obstructive pulmonary disease)    • Diabetes    • Heart attack    • Heart disease    • HTN (hypertension)    • Hyperlipidemia    • Ischemic cardiomyopathy    • Pancreatitis    • Sleep apnea        No Known Allergies    Past Surgical History:   Procedure Laterality Date   • CORONARY ANGIOPLASTY WITH STENT PLACEMENT     • CORONARY ARTERY BYPASS GRAFT     • HEAD/NECK ABSCESS INCISION AND DRAINAGE N/A 3/17/2016    Procedure: HEAD NECK ABSCESS INCISION AND DRAINAGE-POSTERIOR NECK with cultures;  Surgeon: Liz Hernandez DO;  Location: [facility] OR;  Service:    • INCISION AND DRAINAGE ABSCESS         Family History   Problem Relation Age of Onset   • Diabetes Mother    • Heart disease Mother        Social History     Social History   • Marital status:      Spouse name: N/A   • Number of children: N/A   • Years of education: N/A     Social History Main Topics   • Smoking status: Current Every Day Smoker     Packs/day: 1.00     Years: 50.00   • Smokeless tobacco: None   • Alcohol use No   • Drug use: No   • Sexual activity: Defer     Other Topics Concern   • None     Social History Narrative       ED Triage Vitals   Temp Heart Rate Resp BP SpO2   03/29/17 2024 " 03/29/17 2024 03/29/17 2024 03/29/17 2024 03/29/17 2024   97.9 °F (36.6 °C) 97 19 201/119 99 %      Temp src Heart Rate Source Patient Position BP Location FiO2 (%)   03/29/17 2024 03/29/17 2024 03/29/17 2024 03/29/17 2024 --   Oral Monitor Sitting Right arm      Objective   Physical Exam   Constitutional: He is oriented to person, place, and time. He appears well-developed. No distress.   Moans frequently.  Nontoxic-appearing   HENT:   Head: Normocephalic.   Mouth/Throat: Oropharynx is clear and moist.   Eyes: Conjunctivae are normal. No scleral icterus.   Neck: Neck supple.   Painless movement   Cardiovascular: Normal rate and regular rhythm.    Pulmonary/Chest: Effort normal. No respiratory distress. Rales: trace bibasilar crackle.   Abdominal: Soft. Tenderness: left upper quadrant discomfort.  No peritoneal findings. There is no rebound and no guarding.   Musculoskeletal:   MAEE, normal strength   Neurological: He is alert and oriented to person, place, and time.   Skin: Skin is warm and dry.   Psychiatric: He has a normal mood and affect. Thought content normal.   Nursing note and vitals reviewed.      Procedures  EKG           EKG time / Interpretation time: 2046 / 2049  Rhythm/Rate: Sinus, 80  P waves and WV: First degree AV block  QRS, axis: Left bundle branch block   ST and T waves: Repolarization abnormality; QTC prolonged     Interpreted Contemporaneously by me, independently viewed  Unchanged compared to prior 3/14/17    Results for orders placed or performed during the hospital encounter of 03/29/17   Comprehensive Metabolic Panel   Result Value Ref Range    Glucose 206 (H) 65 - 99 mg/dL    BUN 10 8 - 23 mg/dL    Creatinine 0.90 0.76 - 1.27 mg/dL    Sodium 142 136 - 145 mmol/L    Potassium 4.3 3.5 - 5.2 mmol/L    Chloride 102 98 - 107 mmol/L    CO2 26.3 22.0 - 29.0 mmol/L    Calcium 9.1 8.8 - 10.5 mg/dL    Total Protein 7.1 6.0 - 8.5 g/dL    Albumin 4.10 3.50 - 5.20 g/dL    ALT (SGPT) 11 5 - 41 U/L     AST (SGOT) 15 5 - 40 U/L    Alkaline Phosphatase 65 40 - 129 U/L    Total Bilirubin 0.5 0.2 - 1.2 mg/dL    eGFR Non African Amer 85 >60 mL/min/1.73    Globulin 3.0 gm/dL    A/G Ratio 1.4 g/dL    BUN/Creatinine Ratio 11.1 7.0 - 25.0    Anion Gap 13.7 mmol/L   Lipase   Result Value Ref Range    Lipase 43 13 - 60 U/L   CBC Auto Differential   Result Value Ref Range    WBC 9.35 4.80 - 10.80 10*3/mm3    RBC 4.66 (L) 4.70 - 6.10 10*6/mm3    Hemoglobin 13.5 (L) 14.0 - 18.0 g/dL    Hematocrit 40.3 (L) 42.0 - 52.0 %    MCV 86.5 80.0 - 94.0 fL    MCH 29.0 27.0 - 31.0 pg    MCHC 33.5 31.0 - 37.0 g/dL    RDW 16.4 (H) 11.5 - 14.5 %    RDW-SD 51.7 37.0 - 54.0 fl    MPV 10.1 7.4 - 10.4 fL    Platelets 210 140 - 500 10*3/mm3    Neutrophil % 80.9 (H) 45.0 - 70.0 %    Lymphocyte % 11.8 (L) 20.0 - 45.0 %    Monocyte % 6.1 3.0 - 8.0 %    Eosinophil % 0.7 0.0 - 4.0 %    Basophil % 0.3 0.0 - 2.0 %    Immature Grans % 0.2 0.0 - 0.5 %    Neutrophils, Absolute 7.56 1.50 - 8.30 10*3/mm3    Lymphocytes, Absolute 1.10 0.60 - 4.80 10*3/mm3    Monocytes, Absolute 0.57 0.00 - 1.00 10*3/mm3    Eosinophils, Absolute 0.07 (L) 0.10 - 0.30 10*3/mm3    Basophils, Absolute 0.03 0.00 - 0.20 10*3/mm3    Immature Grans, Absolute 0.02 0.00 - 0.03 10*3/mm3    nRBC 0.0 0.0 - 0.0 /100 WBC   Troponin   Result Value Ref Range    Troponin T <0.010 0.000 - 0.030 ng/mL              ED Course  ED Course   Comment By Time   Medical evaluation for acute life threat is negative.  ANTONETTE patient with pain medication to assist with relief of discomfort.  We will observe the patient for 30 minutes after pain medication. Kaleb Galo MD 03/29 2117                  MDM  Number of Diagnoses or Management Options  Recurrent left upper quadrant abdominal pain:   Uncontrolled pain:      Amount and/or Complexity of Data Reviewed  Clinical lab tests: ordered and reviewed  Decide to obtain previous medical records or to obtain history from someone other than the patient: yes  Review  and summarize past medical records: yes (Ct Abdomen Pelvis Without Contrast    Result Date: 3/24/2017  Narrative: EXAM: CT abdomen and pelvis without contrast.  DATE: 03/24/2017  HISTORY: Generalized abdominal pain today. Previous history of pancreatitis.  COMPARISON: Complete abdominal ultrasound 03/15/2017. CT abdomen pelvis 03/14/2017.  PROCEDURE: 3 mm noncontrast axial images through the abdomen and pelvis. Enteric contrast was not administered. Sagittal and coronal reformatted images were obtained.  Radiation dose reduction techniques were utilized including automated exposure control and exposure modulation based on body size.  Abdomen findings:  Nonobstructing left renal stone measures 5 mm. No ureteral calculus or hydronephrosis. Cysts projecting at within the right lower renal pole measures 1.9 cm  Noncontrast appearance of the liver, gallbladder, spleen, adrenals within normal limits.  FINDINGS of chronic calcific pancreatitis without evidence of acute pancreatic inflammatory change. No free fluid. No pseudocysts.  Lung bases are free of consolidation.  Unopacified bowel appears nonthickened and noninflamed. Appendix is normal.  Pelvis findings:  Urinary bladder, prostate and rectum are within normal limits. No pelvic adenopathy or free fluid. Sparse colonic diverticulosis.      Impression: 1. No acute findings in the abdomen or pelvis. No CT explanation for the patient's pain. 2. Chronic calcific pancreatitis. No evidence of acute pancreatitis. 3. Nonobstructing left renal stone. Right renal cyst. 4. Sparse uncomplicated colonic diverticulosis.  This report was finalized on 3/24/2017 12:10 PM by Dr. Muriel Olmstead MD.      Us Abdomen Complete    Result Date: 3/15/2017  Narrative: ULTRASOUND ABDOMEN, COMPLETE, 03/15/2017:  HISTORY: 61-year-old male admitted to the ED yesterday with chest pain and elevated blood pressure. He has a history of pancreatitis. Complains of abdomen pain beginning yesterday  morning.  TECHNIQUE: Grayscale ultrasound imaging of the upper abdomen.  COMPARISON: *  CT abdomen/pelvis, 03/14/2017.  FINDINGS: Gallbladder is negative. No visible cholelithiasis or wall thickening. No intrahepatic or extra hepatic bile duct dilatation (CBD 5 mm). Liver and spleen are normal in size and appearance.  Parenchymal calcifications are visible within the visualized mid body of the pancreas compatible with chronic calcific pancreatitis. Pancreas was better visualized on CT yesterday. Abdominal aorta is normal in caliber, and the intrahepatic IVC is patent.  Small nonobstructing calculus in the left mid kidney. Small benign cyst arising from the surface of the right mid kidney. No evidence of urinary obstruction. Left lower pole renal infarct visible on CT yesterday is not appreciable on ultrasound. Tiny bilateral pleural effusions are visible.      Impression: 1. Normal gallbladder. No cholelithiasis or bile duct dilatation. 2. Chronic calcific pancreatitis. 3. Nonobstructing left renal stone. Small benign right renal cyst. 4. Tiny bilateral pleural effusions.  This report was finalized on 3/15/2017 2:12 PM by Dr. Andrade Rodriguez MD.      Ct Abdomen Pelvis With Contrast    Result Date: 3/14/2017  Narrative: CT ABDOMEN AND PELVIS WITH CONTRAST, 03/14/2017:  HISTORY: 60-year-old male in the ED complaining of epigastric pain and left upper quadrant abdomen pain with nausea beginning earlier this morning. The patient notes a history of intermittent pancreatitis over the prior five years. Lipase levels are currently normal.  TECHNIQUE: CT examination of the abdomen and pelvis with IV contrast. GI contrast was not ordered. Radiation dose reduction techniques were utilized, including automated exposure control and exposure modulation based on body size.  LOWER CHEST FINDINGS: Lung base images show mild interstitial edema and small bilateral pleural effusions suggesting vascular congestion. Clinical  correlation recommended. No pericardial effusion.  ABDOMEN FINDINGS: Diffuse chronic calcific pancreatitis, but no evidence of acute pancreatitis at this time. Nondistended gallbladder. No bile duct or pancreatic duct dilatation. Liver and spleen are normal in size and appearance.  There is a small subacute or chronic infarct in the lower pole left kidney, not present on a CTA examination of 08/09/2013. Nonobstructing 7 mm left mid renal calculus. Small right lower renal cyst. No evidence of urinary obstruction. Normal caliber abdominal aorta.  Mild sigmoid diverticulosis. Small bowel and colon are otherwise normal in caliber and appearance, as imaged. The appendix is normal.  PELVIS FINDINGS: Urinary bladder, prostate and rectum are within normal limits.      Impression: 1. Lung base images showing vascular congestion with mild interstitial edema and small bilateral pleural effusions. 2. Small subacute or chronic left lower pole renal infarct. 3. Chronic calcific pancreatitis. No CT evidence of acute pancreatitis at this time. 4. Mild diverticulosis. Normal appendix. 5. Nonobstructing left mid renal calculus.  This report was finalized on 3/14/2017 11:11 AM by Dr. Andrade Rodriguez MD.      Xr Chest 1 View    Result Date: 3/14/2017  Narrative: CHEST X-RAY, 03/14/2017:  HISTORY: 61-year-old male in the ED complaining of read a history of chest pain and shortness of air.  TECHNIQUE: AP portable upright chest x-ray.  FINDINGS: Mild cardiomegaly is stable. Pulmonary venous redistribution, mild diffuse interstitial edema and tiny bilateral pleural effusions suggesting mild vascular congestion. This is similar when compared with the previous study of 07/14/2016. Prior CABG. Cardiac pacer/ICD.      Impression: 1. Mild vascular congestion with diffuse interstitial edema and tiny pleural effusions. Similar findings were present on 07/14/2016. 2. Stable mild cardiomegaly. CABG.  This report was finalized on 3/14/2017 12:26  PM by Dr. Andrade Rodriguez MD.      )        Final diagnoses:   Recurrent left upper quadrant abdominal pain   Uncontrolled pain              Medication List      Notice     No changes were made to your prescriptions during this visit.               Kaleb Galo MD  03/29/17 1509

## 2017-12-13 ENCOUNTER — APPOINTMENT (OUTPATIENT)
Dept: GENERAL RADIOLOGY | Facility: HOSPITAL | Age: 62
End: 2017-12-13

## 2017-12-13 ENCOUNTER — APPOINTMENT (OUTPATIENT)
Dept: CARDIOLOGY | Facility: HOSPITAL | Age: 62
End: 2017-12-13
Attending: HOSPITALIST

## 2017-12-13 ENCOUNTER — HOSPITAL ENCOUNTER (OUTPATIENT)
Facility: HOSPITAL | Age: 62
Setting detail: OBSERVATION
LOS: 1 days | Discharge: HOME | End: 2017-12-14
Attending: EMERGENCY MEDICINE | Admitting: HOSPITALIST

## 2017-12-13 DIAGNOSIS — J18.9 PNEUMONIA OF RIGHT LOWER LOBE DUE TO INFECTIOUS ORGANISM: ICD-10-CM

## 2017-12-13 DIAGNOSIS — J44.1 ACUTE EXACERBATION OF CHRONIC OBSTRUCTIVE PULMONARY DISEASE (COPD) (HCC): Primary | ICD-10-CM

## 2017-12-13 PROBLEM — M19.90 ARTHRITIS: Status: ACTIVE | Noted: 2017-12-13

## 2017-12-13 PROBLEM — I73.9 PAD (PERIPHERAL ARTERY DISEASE) (HCC): Status: ACTIVE | Noted: 2017-12-13

## 2017-12-13 PROBLEM — E11.9 DIABETES (HCC): Status: ACTIVE | Noted: 2017-12-13

## 2017-12-13 PROBLEM — I96 GANGRENE (HCC): Status: ACTIVE | Noted: 2017-12-13

## 2017-12-13 LAB
ALBUMIN SERPL-MCNC: 3 G/DL (ref 3.5–5.2)
ALBUMIN/GLOB SERPL: 0.9 G/DL
ALP SERPL-CCNC: 66 U/L (ref 39–117)
ALT SERPL W P-5'-P-CCNC: 12 U/L (ref 1–41)
ANION GAP SERPL CALCULATED.3IONS-SCNC: 11.2 MMOL/L
ARTERIAL PATENCY WRIST A: POSITIVE
AST SERPL-CCNC: 11 U/L (ref 1–40)
ATMOSPHERIC PRESS: 746.3 MMHG
BASE EXCESS BLDA CALC-SCNC: -3.1 MMOL/L (ref 0–2)
BASOPHILS # BLD AUTO: 0.03 10*3/MM3 (ref 0–0.2)
BASOPHILS NFR BLD AUTO: 0.3 % (ref 0–1.5)
BDY SITE: ABNORMAL
BILIRUB SERPL-MCNC: 0.5 MG/DL (ref 0.1–1.2)
BUN BLD-MCNC: 20 MG/DL (ref 8–23)
BUN/CREAT SERPL: 21.7 (ref 7–25)
CALCIUM SPEC-SCNC: 8.3 MG/DL (ref 8.6–10.5)
CHLORIDE SERPL-SCNC: 107 MMOL/L (ref 98–107)
CO2 SERPL-SCNC: 23.8 MMOL/L (ref 22–29)
CREAT BLD-MCNC: 0.92 MG/DL (ref 0.76–1.27)
D-LACTATE SERPL-SCNC: 1.8 MMOL/L (ref 0.5–2)
DEPRECATED RDW RBC AUTO: 52.1 FL (ref 37–54)
EOSINOPHIL # BLD AUTO: 0.1 10*3/MM3 (ref 0–0.7)
EOSINOPHIL NFR BLD AUTO: 1 % (ref 0.3–6.2)
ERYTHROCYTE [DISTWIDTH] IN BLOOD BY AUTOMATED COUNT: 14.9 % (ref 11.5–14.5)
FLUAV AG NPH QL: NEGATIVE
FLUBV AG NPH QL IA: NEGATIVE
GAS FLOW AIRWAY: 4 LPM
GFR SERPL CREATININE-BSD FRML MDRD: 83 ML/MIN/1.73
GLOBULIN UR ELPH-MCNC: 3.4 GM/DL
GLUCOSE BLD-MCNC: 305 MG/DL (ref 65–99)
GLUCOSE BLDC GLUCOMTR-MCNC: 170 MG/DL (ref 70–130)
GLUCOSE BLDC GLUCOMTR-MCNC: 255 MG/DL (ref 70–130)
HBA1C MFR BLD: 8.26 % (ref 4.8–5.6)
HCO3 BLDA-SCNC: 21.2 MMOL/L (ref 22–28)
HCT VFR BLD AUTO: 40.9 % (ref 40.4–52.2)
HGB BLD-MCNC: 12.9 G/DL (ref 13.7–17.6)
IMM GRANULOCYTES # BLD: 0.03 10*3/MM3 (ref 0–0.03)
IMM GRANULOCYTES NFR BLD: 0.3 % (ref 0–0.5)
LYMPHOCYTES # BLD AUTO: 1.46 10*3/MM3 (ref 0.9–4.8)
LYMPHOCYTES NFR BLD AUTO: 14.3 % (ref 19.6–45.3)
MCH RBC QN AUTO: 30.3 PG (ref 27–32.7)
MCHC RBC AUTO-ENTMCNC: 31.5 G/DL (ref 32.6–36.4)
MCV RBC AUTO: 96 FL (ref 79.8–96.2)
MODALITY: ABNORMAL
MONOCYTES # BLD AUTO: 0.8 10*3/MM3 (ref 0.2–1.2)
MONOCYTES NFR BLD AUTO: 7.8 % (ref 5–12)
NEUTROPHILS # BLD AUTO: 7.81 10*3/MM3 (ref 1.9–8.1)
NEUTROPHILS NFR BLD AUTO: 76.3 % (ref 42.7–76)
NT-PROBNP SERPL-MCNC: 2514 PG/ML (ref 5–900)
PCO2 BLDA: 34.5 MM HG (ref 35–45)
PH BLDA: 7.4 PH UNITS (ref 7.35–7.45)
PLATELET # BLD AUTO: 139 10*3/MM3 (ref 140–500)
PMV BLD AUTO: 11.1 FL (ref 6–12)
PO2 BLDA: 84.8 MM HG (ref 80–100)
POTASSIUM BLD-SCNC: 4.2 MMOL/L (ref 3.5–5.2)
PROCALCITONIN SERPL-MCNC: 0.04 NG/ML (ref 0.1–0.25)
PROT SERPL-MCNC: 6.4 G/DL (ref 6–8.5)
RBC # BLD AUTO: 4.26 10*6/MM3 (ref 4.6–6)
SAO2 % BLDCOA: 96.5 % (ref 92–99)
SET MECH RESP RATE: 20
SODIUM BLD-SCNC: 142 MMOL/L (ref 136–145)
TOTAL RATE: 20 BREATHS/MINUTE
TROPONIN T SERPL-MCNC: <0.01 NG/ML (ref 0–0.03)
WBC NRBC COR # BLD: 10.23 10*3/MM3 (ref 4.5–10.7)

## 2017-12-13 PROCEDURE — 87486 CHLMYD PNEUM DNA AMP PROBE: CPT | Performed by: HOSPITALIST

## 2017-12-13 PROCEDURE — 82962 GLUCOSE BLOOD TEST: CPT

## 2017-12-13 PROCEDURE — 93005 ELECTROCARDIOGRAM TRACING: CPT | Performed by: EMERGENCY MEDICINE

## 2017-12-13 PROCEDURE — 25010000002 CEFTRIAXONE IN SWFI 1 GRAM/10ML IV PUSH SYRINGE (SIMPLE): Performed by: HOSPITALIST

## 2017-12-13 PROCEDURE — 25010000002 MORPHINE PER 10 MG: Performed by: EMERGENCY MEDICINE

## 2017-12-13 PROCEDURE — 87633 RESP VIRUS 12-25 TARGETS: CPT | Performed by: HOSPITALIST

## 2017-12-13 PROCEDURE — 94640 AIRWAY INHALATION TREATMENT: CPT

## 2017-12-13 PROCEDURE — 63710000001 INSULIN ASPART PER 5 UNITS: Performed by: HOSPITALIST

## 2017-12-13 PROCEDURE — 93010 ELECTROCARDIOGRAM REPORT: CPT | Performed by: INTERNAL MEDICINE

## 2017-12-13 PROCEDURE — 94799 UNLISTED PULMONARY SVC/PX: CPT

## 2017-12-13 PROCEDURE — 96375 TX/PRO/DX INJ NEW DRUG ADDON: CPT

## 2017-12-13 PROCEDURE — 96365 THER/PROPH/DIAG IV INF INIT: CPT

## 2017-12-13 PROCEDURE — 80053 COMPREHEN METABOLIC PANEL: CPT | Performed by: EMERGENCY MEDICINE

## 2017-12-13 PROCEDURE — 83880 ASSAY OF NATRIURETIC PEPTIDE: CPT | Performed by: EMERGENCY MEDICINE

## 2017-12-13 PROCEDURE — 87804 INFLUENZA ASSAY W/OPTIC: CPT | Performed by: EMERGENCY MEDICINE

## 2017-12-13 PROCEDURE — 99285 EMERGENCY DEPT VISIT HI MDM: CPT

## 2017-12-13 PROCEDURE — 25010000002 CEFTRIAXONE PER 250 MG: Performed by: EMERGENCY MEDICINE

## 2017-12-13 PROCEDURE — 93923 UPR/LXTR ART STDY 3+ LVLS: CPT

## 2017-12-13 PROCEDURE — 87798 DETECT AGENT NOS DNA AMP: CPT | Performed by: HOSPITALIST

## 2017-12-13 PROCEDURE — 87581 M.PNEUMON DNA AMP PROBE: CPT | Performed by: HOSPITALIST

## 2017-12-13 PROCEDURE — 85025 COMPLETE CBC W/AUTO DIFF WBC: CPT | Performed by: EMERGENCY MEDICINE

## 2017-12-13 PROCEDURE — 96376 TX/PRO/DX INJ SAME DRUG ADON: CPT

## 2017-12-13 PROCEDURE — 84145 PROCALCITONIN (PCT): CPT | Performed by: EMERGENCY MEDICINE

## 2017-12-13 PROCEDURE — 25010000002 AZITHROMYCIN PER 500 MG: Performed by: HOSPITALIST

## 2017-12-13 PROCEDURE — 25010000002 AZITHROMYCIN PER 500 MG: Performed by: EMERGENCY MEDICINE

## 2017-12-13 PROCEDURE — 71010 HC CHEST PA OR AP: CPT

## 2017-12-13 PROCEDURE — 36600 WITHDRAWAL OF ARTERIAL BLOOD: CPT

## 2017-12-13 PROCEDURE — 84484 ASSAY OF TROPONIN QUANT: CPT | Performed by: EMERGENCY MEDICINE

## 2017-12-13 PROCEDURE — 96372 THER/PROPH/DIAG INJ SC/IM: CPT

## 2017-12-13 PROCEDURE — 83036 HEMOGLOBIN GLYCOSYLATED A1C: CPT | Performed by: HOSPITALIST

## 2017-12-13 PROCEDURE — 96367 TX/PROPH/DG ADDL SEQ IV INF: CPT

## 2017-12-13 PROCEDURE — 25010000002 ENOXAPARIN PER 10 MG: Performed by: HOSPITALIST

## 2017-12-13 PROCEDURE — 83605 ASSAY OF LACTIC ACID: CPT | Performed by: EMERGENCY MEDICINE

## 2017-12-13 PROCEDURE — 87040 BLOOD CULTURE FOR BACTERIA: CPT | Performed by: EMERGENCY MEDICINE

## 2017-12-13 PROCEDURE — 82803 BLOOD GASES ANY COMBINATION: CPT

## 2017-12-13 RX ORDER — MELOXICAM 7.5 MG/1
7.5 TABLET ORAL DAILY
COMMUNITY

## 2017-12-13 RX ORDER — ACETAMINOPHEN 325 MG/1
650 TABLET ORAL EVERY 4 HOURS PRN
Status: DISCONTINUED | OUTPATIENT
Start: 2017-12-13 | End: 2017-12-14 | Stop reason: HOSPADM

## 2017-12-13 RX ORDER — IPRATROPIUM BROMIDE AND ALBUTEROL SULFATE 2.5; .5 MG/3ML; MG/3ML
3 SOLUTION RESPIRATORY (INHALATION)
Status: DISCONTINUED | OUTPATIENT
Start: 2017-12-13 | End: 2017-12-14 | Stop reason: HOSPADM

## 2017-12-13 RX ORDER — GLIPIZIDE 10 MG/1
10 TABLET ORAL EVERY MORNING
Status: DISCONTINUED | OUTPATIENT
Start: 2017-12-14 | End: 2017-12-14 | Stop reason: HOSPADM

## 2017-12-13 RX ORDER — IPRATROPIUM BROMIDE AND ALBUTEROL SULFATE 2.5; .5 MG/3ML; MG/3ML
3 SOLUTION RESPIRATORY (INHALATION) EVERY 4 HOURS PRN
Status: DISCONTINUED | OUTPATIENT
Start: 2017-12-13 | End: 2017-12-14 | Stop reason: HOSPADM

## 2017-12-13 RX ORDER — BUDESONIDE AND FORMOTEROL FUMARATE DIHYDRATE 160; 4.5 UG/1; UG/1
2 AEROSOL RESPIRATORY (INHALATION)
COMMUNITY

## 2017-12-13 RX ORDER — ONDANSETRON 4 MG/1
4 TABLET, ORALLY DISINTEGRATING ORAL EVERY 6 HOURS PRN
Status: DISCONTINUED | OUTPATIENT
Start: 2017-12-13 | End: 2017-12-14 | Stop reason: HOSPADM

## 2017-12-13 RX ORDER — METOPROLOL SUCCINATE 50 MG/1
50 TABLET, EXTENDED RELEASE ORAL DAILY
COMMUNITY

## 2017-12-13 RX ORDER — OXYCODONE AND ACETAMINOPHEN 10; 325 MG/1; MG/1
1 TABLET ORAL EVERY 4 HOURS PRN
Status: DISCONTINUED | OUTPATIENT
Start: 2017-12-13 | End: 2017-12-14 | Stop reason: HOSPADM

## 2017-12-13 RX ORDER — GABAPENTIN 300 MG/1
300 CAPSULE ORAL
COMMUNITY

## 2017-12-13 RX ORDER — IPRATROPIUM BROMIDE AND ALBUTEROL SULFATE 2.5; .5 MG/3ML; MG/3ML
3 SOLUTION RESPIRATORY (INHALATION) ONCE
Status: COMPLETED | OUTPATIENT
Start: 2017-12-13 | End: 2017-12-13

## 2017-12-13 RX ORDER — METOPROLOL SUCCINATE 50 MG/1
50 TABLET, EXTENDED RELEASE ORAL DAILY
Status: DISCONTINUED | OUTPATIENT
Start: 2017-12-13 | End: 2017-12-14 | Stop reason: HOSPADM

## 2017-12-13 RX ORDER — FUROSEMIDE 40 MG/1
40 TABLET ORAL DAILY
Status: DISCONTINUED | OUTPATIENT
Start: 2017-12-13 | End: 2017-12-14 | Stop reason: HOSPADM

## 2017-12-13 RX ORDER — AMIODARONE HYDROCHLORIDE 200 MG/1
200 TABLET ORAL DAILY
COMMUNITY

## 2017-12-13 RX ORDER — AMIODARONE HYDROCHLORIDE 200 MG/1
200 TABLET ORAL DAILY
Status: DISCONTINUED | OUTPATIENT
Start: 2017-12-13 | End: 2017-12-14 | Stop reason: HOSPADM

## 2017-12-13 RX ORDER — SODIUM CHLORIDE 0.9 % (FLUSH) 0.9 %
1-10 SYRINGE (ML) INJECTION AS NEEDED
Status: DISCONTINUED | OUTPATIENT
Start: 2017-12-13 | End: 2017-12-14 | Stop reason: HOSPADM

## 2017-12-13 RX ORDER — NITROGLYCERIN 0.4 MG/1
0.4 TABLET SUBLINGUAL
Status: DISCONTINUED | OUTPATIENT
Start: 2017-12-13 | End: 2017-12-14 | Stop reason: HOSPADM

## 2017-12-13 RX ORDER — GABAPENTIN 300 MG/1
300 CAPSULE ORAL EVERY 8 HOURS SCHEDULED
Status: DISCONTINUED | OUTPATIENT
Start: 2017-12-13 | End: 2017-12-14 | Stop reason: HOSPADM

## 2017-12-13 RX ORDER — ONDANSETRON 2 MG/ML
4 INJECTION INTRAMUSCULAR; INTRAVENOUS EVERY 6 HOURS PRN
Status: DISCONTINUED | OUTPATIENT
Start: 2017-12-13 | End: 2017-12-14 | Stop reason: HOSPADM

## 2017-12-13 RX ORDER — CEFTRIAXONE SODIUM 1 G/50ML
1 INJECTION, SOLUTION INTRAVENOUS ONCE
Status: COMPLETED | OUTPATIENT
Start: 2017-12-13 | End: 2017-12-13

## 2017-12-13 RX ORDER — ROPINIROLE 2 MG/1
4 TABLET, FILM COATED ORAL NIGHTLY
Status: DISCONTINUED | OUTPATIENT
Start: 2017-12-13 | End: 2017-12-14 | Stop reason: HOSPADM

## 2017-12-13 RX ORDER — IPRATROPIUM BROMIDE AND ALBUTEROL SULFATE 2.5; .5 MG/3ML; MG/3ML
SOLUTION RESPIRATORY (INHALATION)
Status: COMPLETED
Start: 2017-12-13 | End: 2017-12-13

## 2017-12-13 RX ORDER — MORPHINE SULFATE 2 MG/ML
INJECTION, SOLUTION INTRAMUSCULAR; INTRAVENOUS
Status: DISPENSED
Start: 2017-12-13 | End: 2017-12-13

## 2017-12-13 RX ORDER — ONDANSETRON 4 MG/1
4 TABLET, FILM COATED ORAL EVERY 6 HOURS PRN
Status: DISCONTINUED | OUTPATIENT
Start: 2017-12-13 | End: 2017-12-14 | Stop reason: HOSPADM

## 2017-12-13 RX ORDER — BUDESONIDE AND FORMOTEROL FUMARATE DIHYDRATE 160; 4.5 UG/1; UG/1
2 AEROSOL RESPIRATORY (INHALATION)
Status: DISCONTINUED | OUTPATIENT
Start: 2017-12-13 | End: 2017-12-14 | Stop reason: HOSPADM

## 2017-12-13 RX ORDER — ASPIRIN 81 MG/1
81 TABLET ORAL DAILY
Status: DISCONTINUED | OUTPATIENT
Start: 2017-12-13 | End: 2017-12-14 | Stop reason: HOSPADM

## 2017-12-13 RX ORDER — ISOSORBIDE MONONITRATE 60 MG/1
60 TABLET, EXTENDED RELEASE ORAL DAILY
Status: DISCONTINUED | OUTPATIENT
Start: 2017-12-13 | End: 2017-12-14 | Stop reason: HOSPADM

## 2017-12-13 RX ORDER — GUAIFENESIN 600 MG/1
600 TABLET, EXTENDED RELEASE ORAL 2 TIMES DAILY
Status: DISCONTINUED | OUTPATIENT
Start: 2017-12-13 | End: 2017-12-14 | Stop reason: HOSPADM

## 2017-12-13 RX ORDER — DEXTROSE MONOHYDRATE 25 G/50ML
25 INJECTION, SOLUTION INTRAVENOUS
Status: DISCONTINUED | OUTPATIENT
Start: 2017-12-13 | End: 2017-12-14 | Stop reason: HOSPADM

## 2017-12-13 RX ORDER — SPIRONOLACTONE 25 MG/1
25 TABLET ORAL 2 TIMES DAILY
Status: DISCONTINUED | OUTPATIENT
Start: 2017-12-13 | End: 2017-12-14 | Stop reason: HOSPADM

## 2017-12-13 RX ORDER — ALBUTEROL SULFATE 2.5 MG/3ML
2.5 SOLUTION RESPIRATORY (INHALATION) ONCE
Status: COMPLETED | OUTPATIENT
Start: 2017-12-13 | End: 2017-12-13

## 2017-12-13 RX ORDER — NICOTINE POLACRILEX 4 MG
15 LOZENGE BUCCAL
Status: DISCONTINUED | OUTPATIENT
Start: 2017-12-13 | End: 2017-12-14 | Stop reason: HOSPADM

## 2017-12-13 RX ADMIN — ALBUTEROL SULFATE 2.5 MG: 2.5 SOLUTION RESPIRATORY (INHALATION) at 08:42

## 2017-12-13 RX ADMIN — ISOSORBIDE MONONITRATE 60 MG: 60 TABLET, EXTENDED RELEASE ORAL at 20:08

## 2017-12-13 RX ADMIN — IPRATROPIUM BROMIDE AND ALBUTEROL SULFATE 3 ML: .5; 3 SOLUTION RESPIRATORY (INHALATION) at 19:53

## 2017-12-13 RX ADMIN — MORPHINE SULFATE 4 MG: 4 INJECTION, SOLUTION INTRAMUSCULAR; INTRAVENOUS at 10:32

## 2017-12-13 RX ADMIN — ASPIRIN 81 MG: 81 TABLET ORAL at 20:08

## 2017-12-13 RX ADMIN — OXYCODONE HYDROCHLORIDE AND ACETAMINOPHEN 1 TABLET: 10; 325 TABLET ORAL at 20:05

## 2017-12-13 RX ADMIN — IPRATROPIUM BROMIDE AND ALBUTEROL SULFATE 3 ML: .5; 3 SOLUTION RESPIRATORY (INHALATION) at 16:15

## 2017-12-13 RX ADMIN — OXYCODONE HYDROCHLORIDE AND ACETAMINOPHEN 1 TABLET: 10; 325 TABLET ORAL at 14:28

## 2017-12-13 RX ADMIN — AMIODARONE HYDROCHLORIDE 200 MG: 200 TABLET ORAL at 20:08

## 2017-12-13 RX ADMIN — FUROSEMIDE 40 MG: 40 TABLET ORAL at 20:07

## 2017-12-13 RX ADMIN — SODIUM CHLORIDE 500 ML: 9 INJECTION, SOLUTION INTRAVENOUS at 08:15

## 2017-12-13 RX ADMIN — GUAIFENESIN 600 MG: 600 TABLET, EXTENDED RELEASE ORAL at 20:08

## 2017-12-13 RX ADMIN — CEFTRIAXONE SODIUM 1 G: 1 INJECTION, SOLUTION INTRAVENOUS at 09:07

## 2017-12-13 RX ADMIN — AZITHROMYCIN MONOHYDRATE 500 MG: 500 INJECTION, POWDER, LYOPHILIZED, FOR SOLUTION INTRAVENOUS at 09:27

## 2017-12-13 RX ADMIN — GABAPENTIN 300 MG: 300 CAPSULE ORAL at 20:07

## 2017-12-13 RX ADMIN — METOPROLOL SUCCINATE 50 MG: 50 TABLET, FILM COATED, EXTENDED RELEASE ORAL at 20:08

## 2017-12-13 RX ADMIN — BUDESONIDE AND FORMOTEROL FUMARATE DIHYDRATE 2 PUFF: 160; 4.5 AEROSOL RESPIRATORY (INHALATION) at 19:54

## 2017-12-13 RX ADMIN — INSULIN ASPART 4 UNITS: 100 INJECTION, SOLUTION INTRAVENOUS; SUBCUTANEOUS at 17:12

## 2017-12-13 RX ADMIN — AZITHROMYCIN MONOHYDRATE 500 MG: 500 INJECTION, POWDER, LYOPHILIZED, FOR SOLUTION INTRAVENOUS at 21:55

## 2017-12-13 RX ADMIN — SACUBITRIL AND VALSARTAN 1 TABLET: 24; 26 TABLET, FILM COATED ORAL at 20:07

## 2017-12-13 RX ADMIN — SPIRONOLACTONE 25 MG: 25 TABLET ORAL at 20:07

## 2017-12-13 RX ADMIN — ROPINIROLE 4 MG: 2 TABLET, FILM COATED ORAL at 23:07

## 2017-12-13 RX ADMIN — INSULIN ASPART 2 UNITS: 100 INJECTION, SOLUTION INTRAVENOUS; SUBCUTANEOUS at 22:07

## 2017-12-13 RX ADMIN — CEFTRIAXONE SODIUM 1000 MG: 1 INJECTION, POWDER, FOR SOLUTION INTRAMUSCULAR; INTRAVENOUS at 20:08

## 2017-12-13 RX ADMIN — ENOXAPARIN SODIUM 40 MG: 40 INJECTION SUBCUTANEOUS at 20:08

## 2017-12-13 RX ADMIN — IPRATROPIUM BROMIDE AND ALBUTEROL SULFATE 3 ML: 2.5; .5 SOLUTION RESPIRATORY (INHALATION) at 16:15

## 2017-12-13 NOTE — ED NOTES
Call received for shortness ofbreath that started one week ago and worsened this am. Pt denies chest pain. Has had a mild cough with productive sputum. Pt has mild  Swelling in BLE. EMS arrived and respiratory rate was 50s EtCOs 17. Pt given Duoneb x2 and placed on 4L via nasal cannula.      Rica Domínguez RN  12/13/17 [id number]

## 2017-12-13 NOTE — ED NOTES
Report called and given to Kathryn CERON 5N [id number],updated on pt current status, discussed completed ER orders, and admitting diagnosis. RN had no further questions at this time. PT and family had no questions at this time.     Esther Frost RN  12/13/17 2850

## 2017-12-13 NOTE — PLAN OF CARE
Problem: Patient Care Overview (Adult)  Goal: Plan of Care Review  Outcome: Ongoing (interventions implemented as appropriate)    12/13/17 6269   Coping/Psychosocial Response Interventions   Plan Of Care Reviewed With patient   Patient Care Overview   Progress no change   Outcome Evaluation   Outcome Summary/Follow up Plan new admission from the ER. patient alert and orientated. c/o 7/10 pain- home pain medication restarted. currently on 4L of o2 with sats at 98%, however appears still short of air. dr mcfarlane paged for admission orders.         Problem: COPD, Chronic Bronchitis/Emphysema (Adult)  Goal: Signs and Symptoms of Listed Potential Problems Will be Absent or Manageable (COPD, Chronic Bronchitis/Emphysema)  Outcome: Ongoing (interventions implemented as appropriate)    Problem: Pain, Chronic (Adult)  Goal: Identify Related Risk Factors and Signs and Symptoms  Outcome: Ongoing (interventions implemented as appropriate)  Goal: Acceptable Pain Control/Comfort Level  Outcome: Ongoing (interventions implemented as appropriate)    Problem: Pressure Ulcer Risk (Ernie Scale) (Adult,Obstetrics,Pediatric)  Goal: Identify Related Risk Factors and Signs and Symptoms  Outcome: Ongoing (interventions implemented as appropriate)  Goal: Skin Integrity  Outcome: Ongoing (interventions implemented as appropriate)

## 2017-12-13 NOTE — H&P
HISTORY AND PHYSICAL   Psychiatric        Patient Identification:  Name: Philippe Giron  Age: 62 y.o.  Sex: male  :  1955  MRN: [id number]                     Primary Care Physician: No Known Provider    Chief Complaint: short of air    History of Present Illness:         The patient is 62-year-old white male with history of arthritis, coronary artery disease with ischemic cardiomyopathy, COPD, diabetes, hypertension, hyperlipidemia and sleep apnea whose admitted with 2 to three-week history of increasing shortness of air with cough and lung congestion.  He's been coughing up some yellow colored sputum.  He denied having any fevers but has had some sweats.  He's not had any chest pain lately.  He's not had any nausea or vomiting.  The patient was brought to the hospital per EMS and received some breathing treatments.  He was found to have pneumonia on chest x-ray and started on IV antibiotics and admitted for further evaluation treatment.  He also complained about having a dark spot on the tip of his left great toe and is concerned about the circulation in his feet.    Past Medical History:  Past Medical History:   Diagnosis Date   • Arthritis    • CAD (coronary artery disease)    • Chest pain    • COPD (chronic obstructive pulmonary disease)    • Diabetes    • Heart attack    • Heart disease    • HTN (hypertension)    • Hyperlipidemia    • Ischemic cardiomyopathy    • Pancreatitis    • Sleep apnea      Past Surgical History:  Past Surgical History:   Procedure Laterality Date   • CARDIAC DEFIBRILLATOR PLACEMENT     • CORONARY ANGIOPLASTY WITH STENT PLACEMENT     • CORONARY ARTERY BYPASS GRAFT     • HEAD/NECK ABSCESS INCISION AND DRAINAGE N/A 3/17/2016    Procedure: HEAD NECK ABSCESS INCISION AND DRAINAGE-POSTERIOR NECK with cultures;  Surgeon: Liz Hernandez DO;  Location: [facility] OR;  Service:    • INCISION AND DRAINAGE ABSCESS        Home Meds:  Prescriptions Prior to Admission    Medication Sig Dispense Refill Last Dose   • amiodarone (PACERONE) 200 MG tablet Take 200 mg by mouth Daily.      • aspirin 81 MG tablet Take 81 mg by mouth Daily.   Taking   • budesonide-formoterol (SYMBICORT) 160-4.5 MCG/ACT inhaler Inhale 2 puffs 2 (Two) Times a Day.      • furosemide (LASIX) 40 MG tablet Take 40 mg by mouth Daily.   3/23/2017 at [facility]   • gabapentin (NEURONTIN) 300 MG capsule Take 300 mg by mouth every night at bedtime.      • glipiZIDE (GLUCOTROL) 10 MG tablet Take 10 mg by mouth Every Morning.   Taking   • isosorbide mononitrate (IMDUR) 60 MG 24 hr tablet Take 60 mg by mouth Daily.   Taking   • meloxicam (MOBIC) 7.5 MG tablet Take 7.5 mg by mouth Daily.      • metoprolol succinate XL (TOPROL-XL) 50 MG 24 hr tablet Take 50 mg by mouth Daily.      • nitroglycerin (NITROSTAT) 0.4 MG SL tablet Place 0.4 mg under the tongue Every 5 (Five) Minutes As Needed for chest pain. Take no more than 3 doses in 15 minutes.   Unknown at Unknown time   • oxyCODONE-acetaminophen (PERCOCET)  MG per tablet Take 1 tablet by mouth Every 4 (Four) Hours As Needed for Moderate Pain (4-6).   Taking   • sacubitril-valsartan (ENTRESTO) 24-26 MG tablet Take 1 tablet by mouth 2 (Two) Times a Day.      • spironolactone (ALDACTONE) 25 MG tablet Take 25 mg by mouth 2 (Two) Times a Day.   Taking   • albuterol (PROVENTIL HFA;VENTOLIN HFA) 108 (90 BASE) MCG/ACT inhaler Ventolin  (90 Base) MCG/ACT Inhalation Aerosol Solution; Patient Sig: Ventolin  (90 Base) MCG/ACT Inhalation Aerosol Solution ; 0; 08-Apr-2015; Active   Taking   • albuterol (PROVENTIL) (2.5 MG/3ML) 0.083% nebulizer solution Take 2.5 mg by nebulization every 4 (four) hours as needed for wheezing. 25 vial 0 Taking   • apixaban (ELIQUIS) 5 MG tablet tablet Take 5 mg by mouth 2 (Two) Times a Day.   Taking   • carvedilol (COREG) 6.25 MG tablet Take 6.25 mg by mouth 2 (Two) Times a Day With Meals.   Taking   • dicyclomine (BENTYL) 20 MG tablet Take  20 mg by mouth Every 6 (Six) Hours.   Taking   • FLUoxetine (PROzac) 20 MG capsule Take 20 mg by mouth Daily.   Taking   • gabapentin (NEURONTIN) 600 MG tablet Take 600 mg by mouth 3 (three) times a day.   Taking   • hydrochlorothiazide (HYDRODIURIL) 25 MG tablet Take 25 mg by mouth Daily.   Taking   • insulin detemir (LEVEMIR) 100 UNIT/ML injection Inject  under the skin Daily.   Taking   • metFORMIN (GLUCOPHAGE) 1000 MG tablet Take 1,000 mg by mouth 2 (Two) Times a Day With Meals.   Taking   • metoprolol tartrate (LOPRESSOR) 25 MG tablet Take 25 mg by mouth 2 (Two) Times a Day.   Taking   • ondansetron ODT (ZOFRAN ODT) 4 MG disintegrating tablet Take 1 tablet by mouth Every 6 (Six) Hours As Needed for Nausea or Vomiting for up to 20 doses. 20 tablet 0    • potassium chloride (MICRO-K) 10 MEQ CR capsule Take  by mouth.   Taking   • prasugrel (EFFIENT) tablet Take  by mouth daily.   Taking   • ranolazine (RANEXA) 500 MG 12 hr tablet Take 500 mg by mouth 2 (Two) Times a Day.   Taking   • rOPINIRole (REQUIP) 2 MG tablet Take 4 mg by mouth Every Night.   Taking   • Sacubitril-Valsartan (ENTRESTO PO) Take  by mouth 2 (Two) Times a Day. Pt reports he receives his med in sample form from his cardiologist and does not know the dosage but takes twice a day.   Taking       Allergies:  No Known Allergies  Immunizations:  There is no immunization history for the selected administration types on file for this patient.  Social History:   Social History     Social History Narrative     Social History     Social History   • Marital status:      Spouse name: N/A   • Number of children: N/A   • Years of education: N/A     Occupational History   • Not on file.     Social History Main Topics   • Smoking status: Current Every Day Smoker     Packs/day: 1.00     Years: 50.00   • Smokeless tobacco: Not on file   • Alcohol use No      Comment: 1 beer every few months   • Drug use: No   • Sexual activity: Defer     Other Topics  "Concern   • Not on file     Social History Narrative       Family History:  Family History   Problem Relation Age of Onset   • Diabetes Mother    • Heart disease Mother         Review of Systems  See history of present illness and past medical history.  Patient denies headache, dizziness, syncope, falls, trauma, change in vision, change in hearing, change in taste, changes in weight, changes in appetite, focal weakness, numbness, or paresthesia.  Patient denies chest pain, palpitations,  orthopnea, PND,  sinus pressure, rhinorrhea, epistaxis, hemoptysis, nausea, vomiting,hematemesis, diarrhea, constipation or hematchezia.  Denies cold or heat intolerance, polydipsia, polyuria, polyphagia. Denies hematuria, pyuria, dysuria, hesitancy, frequency or urgency.  Denies fever, chills,  night sweats.   Remainder of ROS is negative.    Objective:  tMax 24 hrs: Temp (24hrs), Av.1 °F (36.7 °C), Min:97.6 °F (36.4 °C), Max:98.5 °F (36.9 °C)    Vitals Ranges:   Temp:  [97.6 °F (36.4 °C)-98.5 °F (36.9 °C)] 98.5 °F (36.9 °C)  Heart Rate:  [] 92  Resp:  [15-36] 18  BP: (112-156)/() 156/106      Exam:  BP (!) 156/106 (BP Location: Left arm, Patient Position: Lying)  Pulse 92  Temp 98.5 °F (36.9 °C) (Oral)   Resp 18  Ht 172.7 cm (68\")  Wt 85.4 kg (188 lb 4.8 oz)  SpO2 94%  BMI 28.63 kg/m2    General Appearance:    Alert, cooperative, no distress, appears stated age   Head:    Normocephalic, without obvious abnormality, atraumatic   Eyes:    PERRL, conjunctiva/corneas clear, EOM's intact, both eyes   Ears:    Normal external ear canals, both ears   Nose:   Nares normal, septum midline, mucosa normal, no drainage    or sinus tenderness   Throat:   Lips, mucosa, and tongue normal   Neck:   Supple, symmetrical, trachea midline, no adenopathy;     thyroid:  no enlargement/tenderness/nodules; no carotid    bruit or JVD   Back:     Symmetric, no curvature, ROM normal, no CVA tenderness   Lungs:     Wheezes and rhonchi " bilaterally, respirations unlabored   Chest Wall:    No tenderness or deformity    Heart:    Regular rate and rhythm, S1 and S2 normal, no murmur, rub   or gallop   Abdomen:     Soft, non-tender, bowel sounds active all four quadrants,     no masses, no hepatomegaly, no splenomegaly   Extremities:   Extremities normal, atraumatic, no cyanosis or edema   Pulses:   Decreased in feet, gangrene tip of left great toe   Skin:   Skin color, texture, turgor normal, no rashes or lesions   Lymph nodes:   Cervical, supraclavicular, and axillary nodes normal   Neurologic:   CNII-XII intact, normal strength, sensation intact throughout      .    Data Review:  Lab Results (last 72 hours)     Procedure Component Value Units Date/Time    Blood Culture - Blood, [[id number]] Collected:  12/13/17 0805    Specimen:  Blood from Arm, Left Updated:  12/13/17 0813    CBC & Differential [[id number]] Collected:  12/13/17 0805    Specimen:  Blood Updated:  12/13/17 0820    Narrative:       The following orders were created for panel order CBC & Differential.  Procedure                               Abnormality         Status                     ---------                               -----------         ------                     CBC Auto Differential[[id number]]        Abnormal            Final result                 Please view results for these tests on the individual orders.    CBC Auto Differential [[id number]]  (Abnormal) Collected:  12/13/17 0805    Specimen:  Blood Updated:  12/13/17 0820     WBC 10.23 10*3/mm3      RBC 4.26 (L) 10*6/mm3      Hemoglobin 12.9 (L) g/dL      Hematocrit 40.9 %      MCV 96.0 fL      MCH 30.3 pg      MCHC 31.5 (L) g/dL      RDW 14.9 (H) %      RDW-SD 52.1 fl      MPV 11.1 fL      Platelets 139 (L) 10*3/mm3      Neutrophil % 76.3 (H) %      Lymphocyte % 14.3 (L) %      Monocyte % 7.8 %      Eosinophil % 1.0 %      Basophil % 0.3 %      Immature Grans % 0.3 %      Neutrophils, Absolute 7.81 10*3/mm3       "Lymphocytes, Absolute 1.46 10*3/mm3      Monocytes, Absolute 0.80 10*3/mm3      Eosinophils, Absolute 0.10 10*3/mm3      Basophils, Absolute 0.03 10*3/mm3      Immature Grans, Absolute 0.03 10*3/mm3     Lactic Acid, Plasma [[id number]]  (Normal) Collected:  12/13/17 0805    Specimen:  Blood Updated:  12/13/17 0835     Lactate 1.8 mmol/L     Influenza Antigen, Rapid - Swab, Nasopharynx [[id number]]  (Normal) Collected:  12/13/17 0813    Specimen:  Swab from Nasopharynx Updated:  12/13/17 0839     Influenza A Ag, EIA Negative     Influenza B Ag, EIA Negative    BNP [[id number]]  (Abnormal) Collected:  12/13/17 0805    Specimen:  Blood Updated:  12/13/17 0841     proBNP 2514.0 (H) pg/mL     Narrative:       Among patients with dyspnea, NT-proBNP is highly sensitive for the detection of acute congestive heart failure. In addition NT-proBNP of <300 pg/ml effectively rules out acute congestive heart failure with 99% negative predictive value.    Procalcitonin [[id number]]  (Abnormal) Collected:  12/13/17 0805    Specimen:  Blood Updated:  12/13/17 0848     Procalcitonin 0.04 (L) ng/mL     Narrative:       As a Marker for Sepsis (Non-Neonates):   1. <0.5 ng/mL represents a low risk of severe sepsis and/or septic shock.  1. >2 ng/mL represents a high risk of severe sepsis and/or septic shock.    As a Marker for Lower Respiratory Tract Infections that require antibiotic therapy:  PCT on Admission     Antibiotic Therapy             6-12 Hrs later  > 0.5                Strongly Recommended            >0.25 - <0.5         Recommended  0.1 - 0.25           Discouraged                   Remeasure/reassess PCT  <0.1                 Strongly Discouraged          Remeasure/reassess PCT      As 28 day mortality risk marker: \"Change in Procalcitonin Result\" (> 80 % or <=80 %) if Day 0 (or Day 1) and Day 4 values are available. Refer to http://www.[employer]-pct-calculator.com/   Change in PCT <=80 %   A decrease of PCT levels below or equal " to 80 % defines a positive change in PCT test result representing a higher risk for 28-day all-cause mortality of patients diagnosed with severe sepsis or septic shock.  Change in PCT > 80 %   A decrease of PCT levels of more than 80 % defines a negative change in PCT result representing a lower risk for 28-day all-cause mortality of patients diagnosed with severe sepsis or septic shock.                Blood Culture - Blood, [[id number]] Collected:  12/13/17 0904    Specimen:  Blood from Arm, Left Updated:  12/13/17 0911    Comprehensive Metabolic Panel [[id number]]  (Abnormal) Collected:  12/13/17 0904    Specimen:  Blood Updated:  12/13/17 0936     Glucose 305 (H) mg/dL      BUN 20 mg/dL      Creatinine 0.92 mg/dL      Sodium 142 mmol/L      Potassium 4.2 mmol/L      Chloride 107 mmol/L      CO2 23.8 mmol/L      Calcium 8.3 (L) mg/dL      Total Protein 6.4 g/dL      Albumin 3.00 (L) g/dL      ALT (SGPT) 12 U/L      AST (SGOT) 11 U/L      Alkaline Phosphatase 66 U/L      Total Bilirubin 0.5 mg/dL      eGFR Non African Amer 83 mL/min/1.73      Globulin 3.4 gm/dL      A/G Ratio 0.9 g/dL      BUN/Creatinine Ratio 21.7     Anion Gap 11.2 mmol/L     Troponin [[id number]]  (Normal) Collected:  12/13/17 0904    Specimen:  Blood Updated:  12/13/17 0936     Troponin T <0.010 ng/mL     Narrative:       Troponin T Reference Ranges:  Less than 0.03 ng/mL:    Negative for AMI  0.03 to 0.09 ng/mL:      Indeterminant for AMI  Greater than 0.09 ng/mL: Positive for AMI    Blood Gas, Arterial [[id number]]  (Abnormal) Collected:  12/13/17 1001    Specimen:  Arterial Blood Updated:  12/13/17 1004     Site Arterial: right radial     Jamar's Test Positive     pH, Arterial 7.396 pH units      pCO2, Arterial 34.5 (L) mm Hg      pO2, Arterial 84.8 mm Hg      HCO3, Arterial 21.2 (L) mmol/L      Base Excess, Arterial -3.1 (L) mmol/L      O2 Saturation Calculated 96.5 %      Barometric Pressure for Blood Gas 746.3 mmHg      Modality Cannula      Flow Rate 4 lpm      Set [facility] Resp Rate 20     Rate 20 Breaths/minute     Narrative:       sat 94 Meter: [id number] : [id number] Dequan Powell    POC Glucose Once [[id number]]  (Abnormal) Collected:  12/13/17 1606    Specimen:  Blood Updated:  12/13/17 1623     Glucose 255 (H) mg/dL     Narrative:       Meter: [id number] : [id number] Moris Denise                Results from last 7 days  Lab Units 12/13/17  0805   HEMOGLOBIN A1C % 8.26*      Imaging Results (all)     Procedure Component Value Units Date/Time    XR Chest 1 View [[id number]] Collected:  12/13/17 0842     Updated:  12/13/17 0847    Narrative:       PORTAL CHEST SINGLE VIEW 08:20     HISTORY: 62-year-old male with shortness of breath and back pain,  coronary artery disease hypertension and ischemic cardiomyopathy     COMPARISON: 02/24/2015     FINDINGS:  1. Interval increase in opacification right lung base question  developing pneumonia.  2. No other change since previous study.  3. Chronic changes include median sternotomy fractured wires, vascular  calcification without cardiac enlargement, pacemaker/defibrillator and  underlying chronic pulmonary change.     Follow-up PA and lateral would be helpful for further evaluation of  right lower lung pneumonia, alternatively CT chest could be performed.     This report was finalized on 12/13/2017 8:44 AM by Dr. Kaden Paredes MD.           Patient Active Problem List   Diagnosis Code   • Chest pain R07.9   • HTN (hypertension) I10   • CAD (coronary artery disease) I25.10   • Ischemic cardiomyopathy I25.5   • Hyperlipidemia E78.5   • Sleep apnea G47.30   • SOB (shortness of breath) R06.02   • Abdominal pain R10.9   • Acute exacerbation of chronic obstructive pulmonary disease (COPD) J44.1   • Diabetes E11.9   • Arthritis M19.90   • Pneumonia of right lower lobe due to infectious organism J18.1   • PAD (peripheral artery disease) I73.9   • Gangrene, left great toe I96       Assessment:  Active  Hospital Problems (** Indicates Principal Problem)    Diagnosis Date Noted   • **Acute exacerbation of chronic obstructive pulmonary disease (COPD) [J44.1] 12/13/2017   • Diabetes [E11.9] 12/13/2017   • Arthritis [M19.90] 12/13/2017   • Pneumonia of right lower lobe due to infectious organism [J18.1] 12/13/2017   • PAD (peripheral artery disease) [I73.9] 12/13/2017   • Gangrene, left great toe [I96] 12/13/2017   • SOB (shortness of breath) [R06.02] 03/15/2017   • HTN (hypertension) [I10]    • CAD (coronary artery disease) [I25.10]    • Ischemic cardiomyopathy [I25.5]    • Hyperlipidemia [E78.5]    • Sleep apnea [G47.30]       Resolved Hospital Problems    Diagnosis Date Noted Date Resolved   No resolved problems to display.       Plan:  The patient's admitted to hospital will continue with IV antibiotics.  We'll obtain respiratory culture and PCR.  We'll continue with oxygen bronchodilators and Mucinex.  Encouraged patient to stop smoking.    Florentino Ann MD  12/13/2017  5:57 PM

## 2017-12-13 NOTE — ED NOTES
RN introduced self to pt. Pt resting, call light within reach, bed in lowest position. Pt able to communicate and follow commands, pt on 4 L/min nasal cannula.     Esther Frost RN  12/13/17 2847

## 2017-12-13 NOTE — NURSING NOTE
12/13/17 1550   Wound 12/13/17 Left first toe other (see comments)   Date first assessed: 12/13/17   Side: Left  Location: first toe  Type: (c) other (see comments)   Wound WDL ex   Base black eschar;other (see comments)  (dry, hard eschar)   Periwound Area intact;dry   Length (cm) 1   Width (cm) 1   Drainage Amount none     CWOCN consult. Patient has intact black eschar at his toe tip. There is no drainage or redness. No pain at toe. No signs of infection. Patient's feet are both cool with weak pulses. He has numbness in his feet but can still feel me touching him. He is here for COPD exacerbation. This wound is intact at this time. I would recommend that he see a Wound Clinic or a podiatrist outpatient but no topical care needed at this time.

## 2017-12-13 NOTE — ED PROVIDER NOTES
EMERGENCY DEPARTMENT ENCOUNTER    CHIEF COMPLAINT  Chief Complaint: Respiratory distress  History given by: pt/ spouse is present at bedside/ EMS  History limited by: N/A  Room Number: 15/15  PMD: SERGEY Whitman      HPI:  Pt is a 62 y.o. male who presents via EMS complaining of respiratory distress PTA. EMS states they could not hear any air movement when they initially arrived on scene and pt had bilateral expiratory wheezes. EMS administered 2 breathing treatments en route to ER with significant improvement observed after administration. EMS reports pt has a hx of COPD. EMS states that initially pt's O2 Sats were at 74%. On evaluation, pt has been experiencing SOB over the past week with progressive worsening beginning today [PTA]. Pt also c/o productive cough with thick sputum, but denies CP or any other pertinent symptoms. Pt is a known smoker.       Duration: 1 week with worsening today   Onset: gradual  Timing: constant   Location: N/A  Radiation: N/A  Quality: SOB  Intensity/Severity: moderate   Progression: worsening today   Associated Symptoms: cough   Aggravating Factors: None reported   Alleviating Factors: None reported   Previous Episodes: Pt has a hx of COPD.   Treatment before arrival: EMS administered 2 breathing treatments en route to ER with significant improvement.     PAST MEDICAL HISTORY  Active Ambulatory Problems     Diagnosis Date Noted   • Chest pain 03/14/2017   • HTN (hypertension)    • CAD (coronary artery disease)    • Ischemic cardiomyopathy    • Hyperlipidemia    • Sleep apnea    • SOB (shortness of breath) 03/15/2017   • Abdominal pain 03/15/2017     Resolved Ambulatory Problems     Diagnosis Date Noted   • No Resolved Ambulatory Problems     Past Medical History:   Diagnosis Date   • Arthritis    • CAD (coronary artery disease)    • Chest pain    • COPD (chronic obstructive pulmonary disease)    • Diabetes    • Heart attack    • Heart disease    • HTN (hypertension)    •  Hyperlipidemia    • Ischemic cardiomyopathy    • Pancreatitis    • Sleep apnea        PAST SURGICAL HISTORY  Past Surgical History:   Procedure Laterality Date   • CORONARY ANGIOPLASTY WITH STENT PLACEMENT     • CORONARY ARTERY BYPASS GRAFT     • HEAD/NECK ABSCESS INCISION AND DRAINAGE N/A 3/17/2016    Procedure: HEAD NECK ABSCESS INCISION AND DRAINAGE-POSTERIOR NECK with cultures;  Surgeon: Liz Hernandez DO;  Location: [facility];  Service:    • INCISION AND DRAINAGE ABSCESS         FAMILY HISTORY  Family History   Problem Relation Age of Onset   • Diabetes Mother    • Heart disease Mother        SOCIAL HISTORY  Social History     Social History   • Marital status:      Spouse name: N/A   • Number of children: N/A   • Years of education: N/A     Occupational History   • Not on file.     Social History Main Topics   • Smoking status: Current Every Day Smoker     Packs/day: 1.00     Years: 50.00   • Smokeless tobacco: Not on file   • Alcohol use No   • Drug use: No   • Sexual activity: Defer     Other Topics Concern   • Not on file     Social History Narrative       ALLERGIES  Review of patient's allergies indicates no known allergies.    REVIEW OF SYSTEMS  Review of Systems   Constitutional: Negative.  Negative for chills and fever.   HENT: Negative.  Negative for sore throat.    Eyes: Negative.    Respiratory: Positive for cough and shortness of breath.         Respiratory distress    Cardiovascular: Negative.  Negative for chest pain.   Gastrointestinal: Negative.    Genitourinary: Negative.  Negative for dysuria.   Musculoskeletal: Negative.  Negative for back pain.   Skin: Negative.  Negative for rash.   Neurological: Negative.  Negative for headaches.       PHYSICAL EXAM  ED Triage Vitals   Temp Pulse Resp BP SpO2   -- -- -- -- --             Temp src Heart Rate Source Patient Position BP Location FiO2 (%)   -- -- -- -- --              Physical Exam   Constitutional: He is oriented to person, place,  and time and well-developed, well-nourished, and in no distress.   Appears older than stated age   HENT:   Head: Normocephalic and atraumatic.   Eyes: EOM are normal. Pupils are equal, round, and reactive to light.   Neck: Normal range of motion. Neck supple.   Cardiovascular: Regular rhythm, normal heart sounds and intact distal pulses.  Tachycardia present.    Pulmonary/Chest: Breath sounds normal. He is in respiratory distress (mild ).   Diminished bilateral breath sounds   Obvious cough on exam    Abdominal: Soft. There is no tenderness. There is no rebound and no guarding.   Musculoskeletal: Normal range of motion. He exhibits edema (1+ pitting edema in BLE).   Neurological: He is alert and oriented to person, place, and time. He has normal sensation and normal strength.   Skin: Skin is warm and dry.   Psychiatric: Mood and affect normal.   Nursing note and vitals reviewed.      LAB RESULTS  Lab Results (last 24 hours)     Procedure Component Value Units Date/Time    CBC & Differential [[id number]] Collected:  12/13/17 0805    Specimen:  Blood Updated:  12/13/17 0820    Narrative:       The following orders were created for panel order CBC & Differential.  Procedure                               Abnormality         Status                     ---------                               -----------         ------                     CBC Auto Differential[[id number]]        Abnormal            Final result                 Please view results for these tests on the individual orders.    BNP [[id number]]  (Abnormal) Collected:  12/13/17 0805    Specimen:  Blood Updated:  12/13/17 0841     proBNP 2514.0 (H) pg/mL     Narrative:       Among patients with dyspnea, NT-proBNP is highly sensitive for the detection of acute congestive heart failure. In addition NT-proBNP of <300 pg/ml effectively rules out acute congestive heart failure with 99% negative predictive value.    Blood Culture - Blood, [[id number]] Collected:  12/13/17  "0805    Specimen:  Blood from Arm, Left Updated:  12/13/17 0813    Lactic Acid, Plasma [[id number]]  (Normal) Collected:  12/13/17 0805    Specimen:  Blood Updated:  12/13/17 0835     Lactate 1.8 mmol/L     Procalcitonin [[id number]]  (Abnormal) Collected:  12/13/17 0805    Specimen:  Blood Updated:  12/13/17 0848     Procalcitonin 0.04 (L) ng/mL     Narrative:       As a Marker for Sepsis (Non-Neonates):   1. <0.5 ng/mL represents a low risk of severe sepsis and/or septic shock.  1. >2 ng/mL represents a high risk of severe sepsis and/or septic shock.    As a Marker for Lower Respiratory Tract Infections that require antibiotic therapy:  PCT on Admission     Antibiotic Therapy             6-12 Hrs later  > 0.5                Strongly Recommended            >0.25 - <0.5         Recommended  0.1 - 0.25           Discouraged                   Remeasure/reassess PCT  <0.1                 Strongly Discouraged          Remeasure/reassess PCT      As 28 day mortality risk marker: \"Change in Procalcitonin Result\" (> 80 % or <=80 %) if Day 0 (or Day 1) and Day 4 values are available. Refer to http://www.[employer]-pct-calculator.com/   Change in PCT <=80 %   A decrease of PCT levels below or equal to 80 % defines a positive change in PCT test result representing a higher risk for 28-day all-cause mortality of patients diagnosed with severe sepsis or septic shock.  Change in PCT > 80 %   A decrease of PCT levels of more than 80 % defines a negative change in PCT result representing a lower risk for 28-day all-cause mortality of patients diagnosed with severe sepsis or septic shock.                CBC Auto Differential [[id number]]  (Abnormal) Collected:  12/13/17 0805    Specimen:  Blood Updated:  12/13/17 0820     WBC 10.23 10*3/mm3      RBC 4.26 (L) 10*6/mm3      Hemoglobin 12.9 (L) g/dL      Hematocrit 40.9 %      MCV 96.0 fL      MCH 30.3 pg      MCHC 31.5 (L) g/dL      RDW 14.9 (H) %      RDW-SD 52.1 fl      MPV 11.1 fL      " Platelets 139 (L) 10*3/mm3      Neutrophil % 76.3 (H) %      Lymphocyte % 14.3 (L) %      Monocyte % 7.8 %      Eosinophil % 1.0 %      Basophil % 0.3 %      Immature Grans % 0.3 %      Neutrophils, Absolute 7.81 10*3/mm3      Lymphocytes, Absolute 1.46 10*3/mm3      Monocytes, Absolute 0.80 10*3/mm3      Eosinophils, Absolute 0.10 10*3/mm3      Basophils, Absolute 0.03 10*3/mm3      Immature Grans, Absolute 0.03 10*3/mm3     Influenza Antigen, Rapid - Swab, Nasopharynx [[id number]]  (Normal) Collected:  12/13/17 0813    Specimen:  Swab from Nasopharynx Updated:  12/13/17 0839     Influenza A Ag, EIA Negative     Influenza B Ag, EIA Negative    Comprehensive Metabolic Panel [[id number]]  (Abnormal) Collected:  12/13/17 0904    Specimen:  Blood Updated:  12/13/17 0936     Glucose 305 (H) mg/dL      BUN 20 mg/dL      Creatinine 0.92 mg/dL      Sodium 142 mmol/L      Potassium 4.2 mmol/L      Chloride 107 mmol/L      CO2 23.8 mmol/L      Calcium 8.3 (L) mg/dL      Total Protein 6.4 g/dL      Albumin 3.00 (L) g/dL      ALT (SGPT) 12 U/L      AST (SGOT) 11 U/L      Alkaline Phosphatase 66 U/L      Total Bilirubin 0.5 mg/dL      eGFR Non African Amer 83 mL/min/1.73      Globulin 3.4 gm/dL      A/G Ratio 0.9 g/dL      BUN/Creatinine Ratio 21.7     Anion Gap 11.2 mmol/L     Troponin [[id number]]  (Normal) Collected:  12/13/17 0904    Specimen:  Blood Updated:  12/13/17 0936     Troponin T <0.010 ng/mL     Narrative:       Troponin T Reference Ranges:  Less than 0.03 ng/mL:    Negative for AMI  0.03 to 0.09 ng/mL:      Indeterminant for AMI  Greater than 0.09 ng/mL: Positive for AMI    Blood Culture - Blood, [[id number]] Collected:  12/13/17 0904    Specimen:  Blood from Arm, Left Updated:  12/13/17 0911    Blood Gas, Arterial [[id number]]  (Abnormal) Collected:  12/13/17 1001    Specimen:  Arterial Blood Updated:  12/13/17 1004     Site Arterial: right radial     Jamar's Test Positive     pH, Arterial 7.396 pH units      pCO2,  Arterial 34.5 (L) mm Hg      pO2, Arterial 84.8 mm Hg      HCO3, Arterial 21.2 (L) mmol/L      Base Excess, Arterial -3.1 (L) mmol/L      O2 Saturation Calculated 96.5 %      Barometric Pressure for Blood Gas 746.3 mmHg      Modality Cannula     Flow Rate 4 lpm      Set Mech Resp Rate 20     Rate 20 Breaths/minute     Narrative:       sat 94 Meter: [id number] : [id number] Dequan Sherry          I ordered the above labs and reviewed the results    RADIOLOGY  XR Chest 1 View   Final Result   1. Interval increase in opacification right lung base question  developing pneumonia.  2. No other change since previous study.  3. Chronic changes include median sternotomy fractured wires, vascular  calcification without cardiac enlargement, pacemaker/defibrillator and  underlying chronic pulmonary change.        I ordered the above noted radiological studies. Interpreted by radiologist. Reviewed by me in PACS.       PROCEDURES  Critical Care  Performed by: ИВАН DYE  Authorized by: ИВАН DYE     Critical care provider statement:     Critical care time (minutes):  30    Critical care was necessary to treat or prevent imminent or life-threatening deterioration of the following conditions:  Respiratory failure    Critical care was time spent personally by me on the following activities:  Development of treatment plan with patient or surrogate, discussions with consultants, discussions with primary provider, examination of patient, evaluation of patient's response to treatment, ordering and performing treatments and interventions, ordering and review of laboratory studies, ordering and review of radiographic studies, review of old charts and obtaining history from patient or surrogate        EKG           EKG time: 0759  Rhythm/Rate: sinus tachy, 100 occasional PVC's  P waves and DC: normal  QRS, axis: IVCD   ST and T waves: Normal     Interpreted Contemporaneously by me, independently viewed  Lateral T  wave inversions have improved compared to prior 3/29/17      PROGRESS AND CONSULTS  ED Course   Comment By Time   10:02 AM  Patient here for respiratory distress.  Much improved after multiple albuterol treatments.  Given steroids.  CXR shows developing pneumonia.  Given rocephin and azithromycin.  Discussed with Dr. Ann who will admit. Sebastien Morse MD 12/13 1004     0745  Evaluated pt and discussed that we anticipate that pt will be admitted. Pt understands and agrees to plan, all questions addressed at this time.     0756  Ordered IVF, labs, and Chest XR for further evaluation.     0825  Ordered Proventil for SOB.     0845  Rechecked pt who states he is feeling improved.     0855  Rechecked pt. Pt states he has not been admitted to the hospital within the last 2 months. Therefore, plan to give him IV antibiotics for developing pneumonia.     0900  Placed consult to [facility]. Ordered Rocephin and Zithromax for pneumonia.     0948  Discussed pt's case with Dr. Ann [[facility]] who agrees to admit.     MEDICAL DECISION MAKING  Results were reviewed/discussed with the patient and they were also made aware of online access. Pt also made aware that some labs, such as cultures, will not be resulted during ER visit and follow up with PMD is necessary.     MDM  Number of Diagnoses or Management Options     Amount and/or Complexity of Data Reviewed  Clinical lab tests: ordered and reviewed (Glucose = 305, Calcium = 8.3, proBNP = 2514, negative Influenza )  Tests in the radiology section of CPT®: reviewed and ordered (Chest XR:  1. Interval increase in opacification right lung base question developing pneumonia.  2. No other change since previous study.  3. Chronic changes include median sternotomy fractured wires, vascular calcification without cardiac enlargement, pacemaker/defibrillator and underlying chronic pulmonary change.)  Tests in the medicine section of CPT®: ordered and reviewed (Refer to procedure )  Discussion of  test results with the performing providers: yes (Dr. Ann ([facility]))  Independent visualization of images, tracings, or specimens: yes    Critical Care  Total time providing critical care: 30-74 minutes         DIAGNOSIS  Final diagnoses:   Acute exacerbation of chronic obstructive pulmonary disease (COPD)   Pneumonia of right lower lobe due to infectious organism       DISPOSITION  ADMISSION    Discussed treatment plan and reason for admission with pt/family and admitting physician.  Pt/family voiced understanding of the plan for admission for further testing/treatment as needed.         Latest Documented Vital Signs:  As of 10:08 AM  BP- 134/87 HR- 93 Temp- 97.6 °F (36.4 °C) (Tympanic) O2 sat- 94%    --  Documentation assistance provided by yunior Pugh for Dr. Morse.  Information recorded by the scribe was done at my direction and has been verified and validated by me.          Darío Pugh  12/13/17 1004       Sebastien Morse MD  12/13/17 1016

## 2017-12-14 VITALS
HEIGHT: 68 IN | RESPIRATION RATE: 20 BRPM | SYSTOLIC BLOOD PRESSURE: 122 MMHG | OXYGEN SATURATION: 92 % | TEMPERATURE: 98.1 F | BODY MASS INDEX: 28.6 KG/M2 | DIASTOLIC BLOOD PRESSURE: 75 MMHG | WEIGHT: 188.7 LBS | HEART RATE: 102 BPM

## 2017-12-14 LAB
ANION GAP SERPL CALCULATED.3IONS-SCNC: 12.6 MMOL/L
B PERT DNA SPEC QL NAA+PROBE: NOT DETECTED
BASOPHILS # BLD AUTO: 0.02 10*3/MM3 (ref 0–0.2)
BASOPHILS NFR BLD AUTO: 0.2 % (ref 0–1.5)
BH CV LOWER ARTERIAL LEFT GREAT TOE SYS MAX: 72 MMHG
BH CV LOWER ARTERIAL LEFT HIGH THIGH SYS MAX: 169 MMHG
BH CV LOWER ARTERIAL LEFT LOW THIGH SYS MAX: 142 MMHG
BH CV LOWER ARTERIAL LEFT POPLITEAL SYS MAX: 127 MMHG
BH CV LOWER ARTERIAL LEFT TBI RATIO: 0.44
BH CV LOWER ARTERIAL RIGHT ABI RATIO: 0.75
BH CV LOWER ARTERIAL RIGHT DORSALIS PEDIS SYS MAX: 122 MMHG
BH CV LOWER ARTERIAL RIGHT GREAT TOE SYS MAX: 68 MMHG
BH CV LOWER ARTERIAL RIGHT HIGH THIGH SYS MAX: 157 MMHG
BH CV LOWER ARTERIAL RIGHT LOW THIGH SYS MAX: 133 MMHG
BH CV LOWER ARTERIAL RIGHT POPLITEAL SYS MAX: 119 MMHG
BH CV LOWER ARTERIAL RIGHT POST TIBIAL SYS MAX: 121 MMHG
BH CV LOWER ARTERIAL RIGHT TBI RATIO: 0.42
BUN BLD-MCNC: 15 MG/DL (ref 8–23)
BUN/CREAT SERPL: 20 (ref 7–25)
C PNEUM DNA NPH QL NAA+NON-PROBE: NOT DETECTED
CALCIUM SPEC-SCNC: 8 MG/DL (ref 8.6–10.5)
CHLORIDE SERPL-SCNC: 106 MMOL/L (ref 98–107)
CO2 SERPL-SCNC: 22.4 MMOL/L (ref 22–29)
CREAT BLD-MCNC: 0.75 MG/DL (ref 0.76–1.27)
DEPRECATED RDW RBC AUTO: 50.8 FL (ref 37–54)
EOSINOPHIL # BLD AUTO: 0.09 10*3/MM3 (ref 0–0.7)
EOSINOPHIL NFR BLD AUTO: 1.1 % (ref 0.3–6.2)
ERYTHROCYTE [DISTWIDTH] IN BLOOD BY AUTOMATED COUNT: 14.7 % (ref 11.5–14.5)
FLUAV H1 2009 PAND RNA NPH QL NAA+PROBE: NOT DETECTED
FLUAV H1 HA GENE NPH QL NAA+PROBE: NOT DETECTED
FLUAV H3 RNA NPH QL NAA+PROBE: NOT DETECTED
FLUAV SUBTYP SPEC NAA+PROBE: NOT DETECTED
FLUBV RNA ISLT QL NAA+PROBE: NOT DETECTED
GFR SERPL CREATININE-BSD FRML MDRD: 105 ML/MIN/1.73
GLUCOSE BLD-MCNC: 121 MG/DL (ref 65–99)
GLUCOSE BLDC GLUCOMTR-MCNC: 140 MG/DL (ref 70–130)
GLUCOSE BLDC GLUCOMTR-MCNC: 179 MG/DL (ref 70–130)
HADV DNA SPEC NAA+PROBE: NOT DETECTED
HCOV 229E RNA SPEC QL NAA+PROBE: NOT DETECTED
HCOV HKU1 RNA SPEC QL NAA+PROBE: NOT DETECTED
HCOV NL63 RNA SPEC QL NAA+PROBE: NOT DETECTED
HCOV OC43 RNA SPEC QL NAA+PROBE: NOT DETECTED
HCT VFR BLD AUTO: 34.5 % (ref 40.4–52.2)
HGB BLD-MCNC: 10.9 G/DL (ref 13.7–17.6)
HMPV RNA NPH QL NAA+NON-PROBE: NOT DETECTED
HPIV1 RNA SPEC QL NAA+PROBE: NOT DETECTED
HPIV2 RNA SPEC QL NAA+PROBE: NOT DETECTED
HPIV3 RNA NPH QL NAA+PROBE: NOT DETECTED
HPIV4 P GENE NPH QL NAA+PROBE: NOT DETECTED
IMM GRANULOCYTES # BLD: 0.02 10*3/MM3 (ref 0–0.03)
IMM GRANULOCYTES NFR BLD: 0.2 % (ref 0–0.5)
LYMPHOCYTES # BLD AUTO: 1.45 10*3/MM3 (ref 0.9–4.8)
LYMPHOCYTES NFR BLD AUTO: 17.7 % (ref 19.6–45.3)
M PNEUMO IGG SER IA-ACNC: NOT DETECTED
MCH RBC QN AUTO: 30.2 PG (ref 27–32.7)
MCHC RBC AUTO-ENTMCNC: 31.6 G/DL (ref 32.6–36.4)
MCV RBC AUTO: 95.6 FL (ref 79.8–96.2)
MONOCYTES # BLD AUTO: 0.73 10*3/MM3 (ref 0.2–1.2)
MONOCYTES NFR BLD AUTO: 8.9 % (ref 5–12)
NEUTROPHILS # BLD AUTO: 5.89 10*3/MM3 (ref 1.9–8.1)
NEUTROPHILS NFR BLD AUTO: 71.9 % (ref 42.7–76)
PLATELET # BLD AUTO: 116 10*3/MM3 (ref 140–500)
PMV BLD AUTO: 11.1 FL (ref 6–12)
POTASSIUM BLD-SCNC: 3.8 MMOL/L (ref 3.5–5.2)
RBC # BLD AUTO: 3.61 10*6/MM3 (ref 4.6–6)
RHINOVIRUS RNA SPEC NAA+PROBE: NOT DETECTED
RSV RNA NPH QL NAA+NON-PROBE: NOT DETECTED
SODIUM BLD-SCNC: 141 MMOL/L (ref 136–145)
T4 FREE SERPL-MCNC: 1.16 NG/DL (ref 0.93–1.7)
TROPONIN T SERPL-MCNC: <0.01 NG/ML (ref 0–0.03)
TSH SERPL DL<=0.05 MIU/L-ACNC: 0.89 MIU/ML (ref 0.27–4.2)
UPPER ARTERIAL LEFT ARM BRACHIAL SYS MAX: 154 MMHG
UPPER ARTERIAL RIGHT ARM BRACHIAL SYS MAX: 163 MMHG
WBC NRBC COR # BLD: 8.2 10*3/MM3 (ref 4.5–10.7)

## 2017-12-14 PROCEDURE — 80048 BASIC METABOLIC PNL TOTAL CA: CPT | Performed by: HOSPITALIST

## 2017-12-14 PROCEDURE — 63710000001 INSULIN ASPART PER 5 UNITS: Performed by: HOSPITALIST

## 2017-12-14 PROCEDURE — 94799 UNLISTED PULMONARY SVC/PX: CPT

## 2017-12-14 PROCEDURE — 85025 COMPLETE CBC W/AUTO DIFF WBC: CPT | Performed by: HOSPITALIST

## 2017-12-14 PROCEDURE — G0008 ADMIN INFLUENZA VIRUS VAC: HCPCS | Performed by: HOSPITALIST

## 2017-12-14 PROCEDURE — 25010000002 INFLUENZA VAC SUBUNIT QUAD 0.5 ML SUSPENSION PREFILLED SYRINGE: Performed by: HOSPITALIST

## 2017-12-14 PROCEDURE — 90661 CCIIV3 VAC ABX FR 0.5 ML IM: CPT | Performed by: HOSPITALIST

## 2017-12-14 PROCEDURE — G0378 HOSPITAL OBSERVATION PER HR: HCPCS

## 2017-12-14 PROCEDURE — 84484 ASSAY OF TROPONIN QUANT: CPT | Performed by: HOSPITALIST

## 2017-12-14 PROCEDURE — 84439 ASSAY OF FREE THYROXINE: CPT | Performed by: HOSPITALIST

## 2017-12-14 PROCEDURE — 84443 ASSAY THYROID STIM HORMONE: CPT | Performed by: HOSPITALIST

## 2017-12-14 PROCEDURE — 82962 GLUCOSE BLOOD TEST: CPT

## 2017-12-14 RX ORDER — AZITHROMYCIN 250 MG/1
TABLET, FILM COATED ORAL
Qty: 6 TABLET | Refills: 0 | Status: SHIPPED | OUTPATIENT
Start: 2017-12-14

## 2017-12-14 RX ORDER — CEFUROXIME AXETIL 250 MG/1
250 TABLET ORAL 2 TIMES DAILY
Qty: 14 TABLET | Refills: 0 | Status: SHIPPED | OUTPATIENT
Start: 2017-12-14 | End: 2017-12-21

## 2017-12-14 RX ADMIN — OXYCODONE HYDROCHLORIDE AND ACETAMINOPHEN 1 TABLET: 10; 325 TABLET ORAL at 06:10

## 2017-12-14 RX ADMIN — GLIPIZIDE 10 MG: 10 TABLET ORAL at 08:26

## 2017-12-14 RX ADMIN — GABAPENTIN 300 MG: 300 CAPSULE ORAL at 06:10

## 2017-12-14 RX ADMIN — METOPROLOL SUCCINATE 50 MG: 50 TABLET, FILM COATED, EXTENDED RELEASE ORAL at 08:26

## 2017-12-14 RX ADMIN — OXYCODONE HYDROCHLORIDE AND ACETAMINOPHEN 1 TABLET: 10; 325 TABLET ORAL at 01:58

## 2017-12-14 RX ADMIN — IPRATROPIUM BROMIDE AND ALBUTEROL SULFATE 3 ML: .5; 3 SOLUTION RESPIRATORY (INHALATION) at 09:07

## 2017-12-14 RX ADMIN — SPIRONOLACTONE 25 MG: 25 TABLET ORAL at 08:26

## 2017-12-14 RX ADMIN — INSULIN ASPART 2 UNITS: 100 INJECTION, SOLUTION INTRAVENOUS; SUBCUTANEOUS at 12:07

## 2017-12-14 RX ADMIN — BUDESONIDE AND FORMOTEROL FUMARATE DIHYDRATE 2 PUFF: 160; 4.5 AEROSOL RESPIRATORY (INHALATION) at 09:07

## 2017-12-14 RX ADMIN — GUAIFENESIN 600 MG: 600 TABLET, EXTENDED RELEASE ORAL at 08:26

## 2017-12-14 RX ADMIN — ASPIRIN 81 MG: 81 TABLET ORAL at 08:26

## 2017-12-14 RX ADMIN — A/SINGAPORE/GP1908/2015 IVR-180 (H1N1) (AN A/MICHIGAN/45/2015-LIKE VIRUS), A/SINGAPORE/GP2050/2015 (H3N2) (AN A/HONG KONG/4801/2014 - LIKE VIRUS), B/UTAH/9/2014 (A B/PHUKET/3073/2013-LIKE VIRUS), B/HONG KONG/259/2010 (A B/BRISBANE/60/08-LIKE VIRUS) 0.5 ML: 15; 15; 15; 15 INJECTION, SUSPENSION INTRAMUSCULAR at 12:07

## 2017-12-14 RX ADMIN — OXYCODONE HYDROCHLORIDE AND ACETAMINOPHEN 1 TABLET: 10; 325 TABLET ORAL at 10:10

## 2017-12-14 RX ADMIN — ISOSORBIDE MONONITRATE 60 MG: 60 TABLET, EXTENDED RELEASE ORAL at 08:26

## 2017-12-14 RX ADMIN — FUROSEMIDE 40 MG: 40 TABLET ORAL at 08:26

## 2017-12-14 RX ADMIN — SACUBITRIL AND VALSARTAN 1 TABLET: 24; 26 TABLET, FILM COATED ORAL at 08:26

## 2017-12-14 RX ADMIN — AMIODARONE HYDROCHLORIDE 200 MG: 200 TABLET ORAL at 08:26

## 2017-12-14 NOTE — DISCHARGE SUMMARY
PHYSICIAN DISCHARGE SUMMARY                                                                        [facility]    Patient Identification:  Name: Philippe Giron  Age: 62 y.o.  Sex: male  :  1955  MRN: [id number]  Primary Care Physician: No Known Provider    Admit date: 2017  Discharge date and time:2017  Discharged Condition: fair    Discharge Diagnoses:  Active Hospital Problems (** Indicates Principal Problem)    Diagnosis Date Noted   • **Acute exacerbation of chronic obstructive pulmonary disease (COPD) [J44.1] 2017   • Diabetes [E11.9] 2017   • Arthritis [M19.90] 2017   • Pneumonia of right lower lobe due to infectious organism [J18.1] 2017   • PAD (peripheral artery disease) [I73.9] 2017   • Gangrene, left great toe [I96] 2017   • SOB (shortness of breath) [R06.02] 03/15/2017   • HTN (hypertension) [I10]    • CAD (coronary artery disease) [I25.10]    • Ischemic cardiomyopathy [I25.5]    • Hyperlipidemia [E78.5]    • Sleep apnea [G47.30]       Resolved Hospital Problems    Diagnosis Date Noted Date Resolved   No resolved problems to display.      Patient Active Problem List   Diagnosis Code   • Chest pain R07.9   • HTN (hypertension) I10   • CAD (coronary artery disease) I25.10   • Ischemic cardiomyopathy I25.5   • Hyperlipidemia E78.5   • Sleep apnea G47.30   • SOB (shortness of breath) R06.02   • Abdominal pain R10.9   • Acute exacerbation of chronic obstructive pulmonary disease (COPD) J44.1   • Diabetes E11.9   • Arthritis M19.90   • Pneumonia of right lower lobe due to infectious organism J18.1   • PAD (peripheral artery disease) I73.9   • Gangrene, left great toe I96       PMHX:   Past Medical History:   Diagnosis Date   • Arthritis    • CAD (coronary artery disease)    • Chest pain    • COPD (chronic obstructive pulmonary disease)    • Diabetes    • Heart attack    •  Heart disease    • HTN (hypertension)    • Hyperlipidemia    • Ischemic cardiomyopathy    • Pancreatitis    • Sleep apnea      PSHX:   Past Surgical History:   Procedure Laterality Date   • CARDIAC DEFIBRILLATOR PLACEMENT     • CORONARY ANGIOPLASTY WITH STENT PLACEMENT     • CORONARY ARTERY BYPASS GRAFT     • HEAD/NECK ABSCESS INCISION AND DRAINAGE N/A 3/17/2016    Procedure: HEAD NECK ABSCESS INCISION AND DRAINAGE-POSTERIOR NECK with cultures;  Surgeon: Liz Hernandez DO;  Location: [facility];  Service:    • INCISION AND DRAINAGE ABSCESS         Hospital Course: Philippe Giron  is 62-year-old white male with history of arthritis, coronary artery disease with ischemic cardiomyopathy, COPD, diabetes, hypertension, hyperlipidemia and sleep apnea whose admitted with 2 to three-week history of increasing shortness of air with cough and lung congestion. He's been coughing up some yellow colored sputum. He denied having any fevers but has had some sweats. He's not had any chest pain lately. He's not had any nausea or vomiting. The patient was brought to the hospital per EMS and received some breathing treatments. He was found to have pneumonia on chest x-ray and started on IV antibiotics and admitted for further evaluation treatment. He also complained about having a dark spot on the tip of his left great toe and is concerned about the circulation in his feet.        The patient was admitted hospital and treated with IV antibiotics for pneumonia and he received oxygen and bronchodilators.  He was feeling much better after being in the hospital for a day and also got noninvasive arterial vascular studies which suggested he had significant peripheral vascular disease in his lower extremities.  He felt well enough to go home and will continue with some oral antibiotics for his pneumonia.  He did not qualify for home oxygen.  He was encouraged to stop smoking and recommended he follow-up with vascular surgery as  outpatient for further evaluation treatment of significant peripheral vascular disease.    Consults:     Consults     Date and Time Order Name Status Description    12/13/2017 0900 [facility] (on-call MD unless specified) Completed           Results from last 7 days  Lab Units 12/14/17  0508   WBC 10*3/mm3 8.20   HEMOGLOBIN g/dL 10.9*   HEMATOCRIT % 34.5*   PLATELETS 10*3/mm3 116*       Results from last 7 days  Lab Units 12/14/17  0508   SODIUM mmol/L 141   POTASSIUM mmol/L 3.8   CHLORIDE mmol/L 106   CO2 mmol/L 22.4   BUN mg/dL 15   CREATININE mg/dL 0.75*   GLUCOSE mg/dL 121*   CALCIUM mg/dL 8.0*     Significant Diagnostic Studies:   Lab Results   Component Value Date    WBC 8.20 12/14/2017    HGB 10.9 (L) 12/14/2017    HCT 34.5 (L) 12/14/2017     (L) 12/14/2017     Lab Results   Component Value Date     12/14/2017    K 3.8 12/14/2017     12/14/2017    CO2 22.4 12/14/2017    BUN 15 12/14/2017    CREATININE 0.75 (L) 12/14/2017    GLUCOSE 121 (H) 12/14/2017     Lab Results   Component Value Date    CALCIUM 8.0 (L) 12/14/2017     Lab Results   Component Value Date    AST 11 12/13/2017    ALT 12 12/13/2017    ALKPHOS 66 12/13/2017     No results found for: APTT, INR  No results found for: COLORU, CLARITYU, SPECGRAV, PHUR, PROTEINUR, GLUCOSEU, KETONESU, BLOODU, NITRITE, LEUKOCYTESUR, BILIRUBINUR, UROBILINOGEN, RBCUA, WBCUA, BACTERIA  Lab Results   Component Value Date    TROPONINT <0.010 12/14/2017     No components found for: HGBA1C;2  No components found for: TSH;2  Imaging Results (all)     Procedure Component Value Units Date/Time    XR Chest 1 View [[id number]] Collected:  12/13/17 0842     Updated:  12/13/17 0847    Narrative:       PORTAL CHEST SINGLE VIEW 08:20     HISTORY: 62-year-old male with shortness of breath and back pain,  coronary artery disease hypertension and ischemic cardiomyopathy     COMPARISON: 02/24/2015     FINDINGS:  1. Interval increase in opacification right lung base  question  developing pneumonia.  2. No other change since previous study.  3. Chronic changes include median sternotomy fractured wires, vascular  calcification without cardiac enlargement, pacemaker/defibrillator and  underlying chronic pulmonary change.     Follow-up PA and lateral would be helpful for further evaluation of  right lower lung pneumonia, alternatively CT chest could be performed.     This report was finalized on 2017 8:44 AM by Dr. Kaden Paredes MD.           Philippe Giron   Noninvasive physiologic studies of lower extremity arteries, multilevel, bilateral(eg, ankle/brachial indices, Doppler waveform analysis...)   Order# [id number]    Reading physician: Arnulfo Coulter MD   Ordering physician: Florentino Ann MD   Study date: 17         Patient Information      Patient Name MRN Sex  (Age)     Philippe Giron [id number] Male 1955 (62 y.o.)       Clinical Indication      gangrene; atherosclerosis of native artery; left; toes       Admission Information      Admission Date/Time Discharge Date/Time Room/Bed     17  0749  525/1       Interpretation Summary      · Right Conclusion: The right MERCEDEZ is moderately reduced. Waveforms are consistent with femoral disease, popliteal disease and tib-peroneal disease. Moderate digital ischemia.  · Left Conclusion: Waveforms are consistent with femoral disease, popliteal disease and tib-peroneal disease. The left MERCEDEZ is unable to be assessed due to vessel incompressibility. Moderate digital ischemia.           Study Findings      Right Sided Findings:  • Right Common Femoral: The pulse is 2+.   • Right Popliteal: The pulse is detected w/ doppler.   • Right Posterior Tibial: The pulse is detected w/ doppler.   • Right Dorsalis Pedis: The pulse is detected w/ doppler.     Left Sided Findings:  • Left Common Femoral: The pulse is 2+.   • Left Popliteal: The pulse is detected w/ doppler.   • Left Posterior Tibial: The pulse is detected w/  doppler.   • Left Dorsalis Pedis: The pulse is detected w/ doppler.   Left lower assessment findings include ulceration and gangrenous changes.       Study Impression      Right Conclusion: The right MERCEDEZ is moderately reduced. Waveforms are consistent with femoral disease, popliteal disease and tib-peroneal disease. Moderate digital ischemia.     Left Conclusion: Waveforms are consistent with femoral disease, popliteal disease and tib-peroneal disease. The left MERCEDEZ is unable to be assessed due to vessel incompressibility. Moderate digital ischemia.       Lab Results (last 7 days)     Procedure Component Value Units Date/Time    CBC & Differential [[id number]] Collected:  12/13/17 0805    Specimen:  Blood Updated:  12/13/17 0820    Narrative:       The following orders were created for panel order CBC & Differential.  Procedure                               Abnormality         Status                     ---------                               -----------         ------                     CBC Auto Differential[[id number]]        Abnormal            Final result                 Please view results for these tests on the individual orders.    CBC Auto Differential [[id number]]  (Abnormal) Collected:  12/13/17 0805    Specimen:  Blood Updated:  12/13/17 0820     WBC 10.23 10*3/mm3      RBC 4.26 (L) 10*6/mm3      Hemoglobin 12.9 (L) g/dL      Hematocrit 40.9 %      MCV 96.0 fL      MCH 30.3 pg      MCHC 31.5 (L) g/dL      RDW 14.9 (H) %      RDW-SD 52.1 fl      MPV 11.1 fL      Platelets 139 (L) 10*3/mm3      Neutrophil % 76.3 (H) %      Lymphocyte % 14.3 (L) %      Monocyte % 7.8 %      Eosinophil % 1.0 %      Basophil % 0.3 %      Immature Grans % 0.3 %      Neutrophils, Absolute 7.81 10*3/mm3      Lymphocytes, Absolute 1.46 10*3/mm3      Monocytes, Absolute 0.80 10*3/mm3      Eosinophils, Absolute 0.10 10*3/mm3      Basophils, Absolute 0.03 10*3/mm3      Immature Grans, Absolute 0.03 10*3/mm3     Lactic Acid, Plasma  "[[id number]]  (Normal) Collected:  12/13/17 0805    Specimen:  Blood Updated:  12/13/17 0835     Lactate 1.8 mmol/L     Influenza Antigen, Rapid - Swab, Nasopharynx [[id number]]  (Normal) Collected:  12/13/17 0813    Specimen:  Swab from Nasopharynx Updated:  12/13/17 0839     Influenza A Ag, EIA Negative     Influenza B Ag, EIA Negative    BNP [[id number]]  (Abnormal) Collected:  12/13/17 0805    Specimen:  Blood Updated:  12/13/17 0841     proBNP 2514.0 (H) pg/mL     Narrative:       Among patients with dyspnea, NT-proBNP is highly sensitive for the detection of acute congestive heart failure. In addition NT-proBNP of <300 pg/ml effectively rules out acute congestive heart failure with 99% negative predictive value.    Procalcitonin [[id number]]  (Abnormal) Collected:  12/13/17 0805    Specimen:  Blood Updated:  12/13/17 0848     Procalcitonin 0.04 (L) ng/mL     Narrative:       As a Marker for Sepsis (Non-Neonates):   1. <0.5 ng/mL represents a low risk of severe sepsis and/or septic shock.  1. >2 ng/mL represents a high risk of severe sepsis and/or septic shock.    As a Marker for Lower Respiratory Tract Infections that require antibiotic therapy:  PCT on Admission     Antibiotic Therapy             6-12 Hrs later  > 0.5                Strongly Recommended            >0.25 - <0.5         Recommended  0.1 - 0.25           Discouraged                   Remeasure/reassess PCT  <0.1                 Strongly Discouraged          Remeasure/reassess PCT      As 28 day mortality risk marker: \"Change in Procalcitonin Result\" (> 80 % or <=80 %) if Day 0 (or Day 1) and Day 4 values are available. Refer to http://www.Sincerely[employer]-pct-calculator.com/   Change in PCT <=80 %   A decrease of PCT levels below or equal to 80 % defines a positive change in PCT test result representing a higher risk for 28-day all-cause mortality of patients diagnosed with severe sepsis or septic shock.  Change in PCT > 80 %   A decrease of PCT levels of " more than 80 % defines a negative change in PCT result representing a lower risk for 28-day all-cause mortality of patients diagnosed with severe sepsis or septic shock.                Comprehensive Metabolic Panel [[id number]]  (Abnormal) Collected:  12/13/17 0904    Specimen:  Blood Updated:  12/13/17 0936     Glucose 305 (H) mg/dL      BUN 20 mg/dL      Creatinine 0.92 mg/dL      Sodium 142 mmol/L      Potassium 4.2 mmol/L      Chloride 107 mmol/L      CO2 23.8 mmol/L      Calcium 8.3 (L) mg/dL      Total Protein 6.4 g/dL      Albumin 3.00 (L) g/dL      ALT (SGPT) 12 U/L      AST (SGOT) 11 U/L      Alkaline Phosphatase 66 U/L      Total Bilirubin 0.5 mg/dL      eGFR Non African Amer 83 mL/min/1.73      Globulin 3.4 gm/dL      A/G Ratio 0.9 g/dL      BUN/Creatinine Ratio 21.7     Anion Gap 11.2 mmol/L     Troponin [[id number]]  (Normal) Collected:  12/13/17 0904    Specimen:  Blood Updated:  12/13/17 0936     Troponin T <0.010 ng/mL     Narrative:       Troponin T Reference Ranges:  Less than 0.03 ng/mL:    Negative for AMI  0.03 to 0.09 ng/mL:      Indeterminant for AMI  Greater than 0.09 ng/mL: Positive for AMI    Blood Gas, Arterial [[id number]]  (Abnormal) Collected:  12/13/17 1001    Specimen:  Arterial Blood Updated:  12/13/17 1004     Site Arterial: right radial     Jamar's Test Positive     pH, Arterial 7.396 pH units      pCO2, Arterial 34.5 (L) mm Hg      pO2, Arterial 84.8 mm Hg      HCO3, Arterial 21.2 (L) mmol/L      Base Excess, Arterial -3.1 (L) mmol/L      O2 Saturation Calculated 96.5 %      Barometric Pressure for Blood Gas 746.3 mmHg      Modality Cannula     Flow Rate 4 lpm      Set [facility] Resp Rate 20     Rate 20 Breaths/minute     Narrative:       sat 94 Meter: [id number] : [id number] Dequan Powell    POC Glucose Once [[id number]]  (Abnormal) Collected:  12/13/17 1606    Specimen:  Blood Updated:  12/13/17 1623     Glucose 255 (H) mg/dL     Narrative:       Meter: [id number] :  [id number] Moris Denise    Hemoglobin A1c [[id number]]  (Abnormal) Collected:  12/13/17 0805    Specimen:  Blood Updated:  12/13/17 1738     Hemoglobin A1C 8.26 (H) %     Narrative:       Hemoglobin A1C Ranges:    Increased Risk for Diabetes  5.7% to 6.4%  Diabetes                     >= 6.5%  Diabetic Goal                < 7.0%    POC Glucose Once [[id number]]  (Abnormal) Collected:  12/13/17 2056    Specimen:  Blood Updated:  12/13/17 2058     Glucose 170 (H) mg/dL     Narrative:       Meter: [id number] : [id number] Tien Cabezas CNA    Respiratory Panel, PCR - Swab, Nasopharynx [[id number]]  (Normal) Collected:  12/13/17 2214    Specimen:  Swab from Nasopharynx Updated:  12/14/17 0100     ADENOVIRUS, PCR Not Detected     Coronavirus 229E Not Detected     Coronavirus HKU1 Not Detected     Coronavirus NL63 Not Detected     Coronavirus OC43 Not Detected     Human Metapneumovirus Not Detected     Human Rhinovirus/Enterovirus Not Detected     Influenza B PCR Not Detected     Parainfluenza Virus 1 Not Detected     Parainfluenza Virus 2 Not Detected     Parainfluenza Virus 3 Not Detected     Parainfluenza Virus 4 Not Detected     Bordetella pertussis pcr Not Detected     Influenza 2009 H1N1 by PCR Not Detected     Chlamydophila pneumoniae PCR Not Detected     Mycoplasma pneumo by PCR Not Detected     Influenza A PCR Not Detected     Influenza A H3 Not Detected     Influenza A H1 Not Detected     RSV, PCR Not Detected    POC Glucose Once [[id number]]  (Abnormal) Collected:  12/14/17 0559    Specimen:  Blood Updated:  12/14/17 0600     Glucose 140 (H) mg/dL     Narrative:       Meter: [id number] : [id number] Tien Brownonya CNA    CBC Auto Differential [[id number]]  (Abnormal) Collected:  12/14/17 0508    Specimen:  Blood Updated:  12/14/17 0652     WBC 8.20 10*3/mm3      RBC 3.61 (L) 10*6/mm3      Hemoglobin 10.9 (L) g/dL      Hematocrit 34.5 (L) %      MCV 95.6 fL      MCH 30.2 pg      MCHC 31.6 (L) g/dL      RDW 14.7 (H)  %      RDW-SD 50.8 fl      MPV 11.1 fL      Platelets 116 (L) 10*3/mm3      Neutrophil % 71.9 %      Lymphocyte % 17.7 (L) %      Monocyte % 8.9 %      Eosinophil % 1.1 %      Basophil % 0.2 %      Immature Grans % 0.2 %      Neutrophils, Absolute 5.89 10*3/mm3      Lymphocytes, Absolute 1.45 10*3/mm3      Monocytes, Absolute 0.73 10*3/mm3      Eosinophils, Absolute 0.09 10*3/mm3      Basophils, Absolute 0.02 10*3/mm3      Immature Grans, Absolute 0.02 10*3/mm3     Basic Metabolic Panel [[id number]]  (Abnormal) Collected:  12/14/17 0508    Specimen:  Blood Updated:  12/14/17 0708     Glucose 121 (H) mg/dL      BUN 15 mg/dL      Creatinine 0.75 (L) mg/dL      Sodium 141 mmol/L      Potassium 3.8 mmol/L      Chloride 106 mmol/L      CO2 22.4 mmol/L      Calcium 8.0 (L) mg/dL      eGFR Non African Amer 105 mL/min/1.73      BUN/Creatinine Ratio 20.0     Anion Gap 12.6 mmol/L     Narrative:       GFR Normal >60  Chronic Kidney Disease <60  Kidney Failure <15    T4, Free [[id number]]  (Normal) Collected:  12/14/17 0508    Specimen:  Blood Updated:  12/14/17 0722     Free T4 1.16 ng/dL     Troponin [[id number]]  (Normal) Collected:  12/14/17 0508    Specimen:  Blood Updated:  12/14/17 0722     Troponin T <0.010 ng/mL     Narrative:       Troponin T Reference Ranges:  Less than 0.03 ng/mL:    Negative for AMI  0.03 to 0.09 ng/mL:      Indeterminant for AMI  Greater than 0.09 ng/mL: Positive for AMI    TSH [[id number]]  (Normal) Collected:  12/14/17 0508    Specimen:  Blood Updated:  12/14/17 0722     TSH 0.890 mIU/mL     Blood Culture - Blood, [[id number]]  (Normal) Collected:  12/13/17 0805    Specimen:  Blood from Arm, Left Updated:  12/14/17 0816     Blood Culture No growth at 24 hours    Blood Culture - Blood, [[id number]]  (Normal) Collected:  12/13/17 0904    Specimen:  Blood from Arm, Left Updated:  12/14/17 0916     Blood Culture No growth at 24 hours    POC Glucose Once [[id number]]  (Abnormal) Collected:  12/14/17 1053  "   Specimen:  Blood Updated:  12/14/17 1058     Glucose 179 (H) mg/dL     Narrative:       Meter: [id number] : [id number] Marty ACKERMAN        /75 (BP Location: Left arm, Patient Position: Lying)  Pulse 102  Temp 98.1 °F (36.7 °C) (Oral)   Resp 20  Ht 172.7 cm (68\")  Wt 85.6 kg (188 lb 11.2 oz)  SpO2 92%  BMI 28.69 kg/m2    Discharge Exam:  General Appearance:    Alert, cooperative, no distress                          Head:    Normocephalic, without obvious abnormality, atraumatic                          Eyes:                            Throat:   Lips, tongue, gums normal                          Neck:   Supple, symmetrical, trachea midline, no JVD                        Lungs:     Clear to auscultation bilaterally, respirations unlabored                Chest Wall:    No tenderness or deformity                        Heart:    Regular rate and rhythm, S1 and S2 normal, no murmur,no  Rub or gallop                  Abdomen:     Soft, non-tender, bowel sounds active, no masses, no organomegaly                  Extremities:   Extremities normal, atraumatic, no cyanosis or edema , diminished pulses in lower extremities with gangrene on the tip of left great toe.                            Skin:   Skin is warm and dry,  no rashes or palpable lesions                  Neurologic:   no focal deficits noted     Disposition:  Home    Patient Instructions:    Philippe Giron   Home Medication Instructions WHIT:[id number]    Printed on:12/14/17 1104   Medication Information                      albuterol (PROVENTIL HFA;VENTOLIN HFA) 108 (90 BASE) MCG/ACT inhaler  Ventolin  (90 Base) MCG/ACT Inhalation Aerosol Solution; Patient Sig: Ventolin  (90 Base) MCG/ACT Inhalation Aerosol Solution ; 0; 08-Apr-2015; Active             albuterol (PROVENTIL) (2.5 MG/3ML) 0.083% nebulizer solution  Take 2.5 mg by nebulization every 4 (four) hours as needed for wheezing.             amiodarone (PACERONE) 200 " MG tablet  Take 200 mg by mouth Daily.             apixaban (ELIQUIS) 5 MG tablet tablet  Take 5 mg by mouth 2 (Two) Times a Day.             aspirin 81 MG tablet  Take 81 mg by mouth Daily.             azithromycin (ZITHROMAX Z-JEN) 250 MG tablet  Take 2 tablets the first day, then 1 tablet daily for 4 days.             budesonide-formoterol (SYMBICORT) 160-4.5 MCG/ACT inhaler  Inhale 2 puffs 2 (Two) Times a Day.             cefuroxime (CEFTIN) 250 MG tablet  Take 1 tablet by mouth 2 (Two) Times a Day for 7 days.             dicyclomine (BENTYL) 20 MG tablet  Take 20 mg by mouth Every 6 (Six) Hours.             FLUoxetine (PROzac) 20 MG capsule  Take 20 mg by mouth Daily.             furosemide (LASIX) 40 MG tablet  Take 40 mg by mouth Daily.             gabapentin (NEURONTIN) 300 MG capsule  Take 300 mg by mouth every night at bedtime.             gabapentin (NEURONTIN) 600 MG tablet  Take 600 mg by mouth 3 (three) times a day.             glipiZIDE (GLUCOTROL) 10 MG tablet  Take 10 mg by mouth Every Morning.             isosorbide mononitrate (IMDUR) 60 MG 24 hr tablet  Take 60 mg by mouth Daily.             meloxicam (MOBIC) 7.5 MG tablet  Take 7.5 mg by mouth Daily.             metoprolol succinate XL (TOPROL-XL) 50 MG 24 hr tablet  Take 50 mg by mouth Daily.             nitroglycerin (NITROSTAT) 0.4 MG SL tablet  Place 0.4 mg under the tongue Every 5 (Five) Minutes As Needed for chest pain. Take no more than 3 doses in 15 minutes.             ondansetron ODT (ZOFRAN ODT) 4 MG disintegrating tablet  Take 1 tablet by mouth Every 6 (Six) Hours As Needed for Nausea or Vomiting for up to 20 doses.             oxyCODONE-acetaminophen (PERCOCET)  MG per tablet  Take 1 tablet by mouth Every 4 (Four) Hours As Needed for Moderate Pain (4-6).             potassium chloride (MICRO-K) 10 MEQ CR capsule  Take  by mouth.             ranolazine (RANEXA) 500 MG 12 hr tablet  Take 500 mg by mouth 2 (Two) Times a Day.              rOPINIRole (REQUIP) 2 MG tablet  Take 4 mg by mouth Every Night.             sacubitril-valsartan (ENTRESTO) 24-26 MG tablet  Take 1 tablet by mouth 2 (Two) Times a Day.             spironolactone (ALDACTONE) 25 MG tablet  Take 25 mg by mouth 2 (Two) Times a Day.               No future appointments.  Follow-up Information     Follow up with No Known Provider .    Why:  Follow up with PCP in 1 week    Contact information:    [facility] [address]          Follow up with Contreras Gill MD .    Specialty:  Vascular Surgery    Why:  Make appointment for vascular evaluation    Contact information:    [address] [facility] [address]  [place] [id number] [phone]          Discharge Order     Start     Ordered    12/14/17 1102  Discharge patient  Once     Comments:  Stop smoking   Expected Discharge Date:  12/14/17    Discharge Disposition:  Home or Self Care        12/14/17 1103          Total time spent discharging patient including evaluation,post hospitalization follow up,  medication and post hospitalization instructions and education total time exceeds 30 minutes.    Signed:  Florentino Ann MD  12/14/2017  11:04 AM

## 2017-12-14 NOTE — PROGRESS NOTES
Discharge Planning Assessment  [facility]     Patient Name: Philippe Giron  MRN: [id number]  Today's Date: 12/14/2017    Admit Date: 12/13/2017          Discharge Needs Assessment       12/14/17 1135    Living Environment    Lives With spouse    Living Arrangements mobile home    Home Accessibility no concerns    Stair Railings at Home none    Type of Financial/Environmental Concern none    Transportation Available taxi;family or friend will provide;car    Living Environment Comment Requests Taxi voucher to get to car when ambulance took him. Wife with him and family lives 75 miles away.    Living Environment    Provides Primary Care For no one, unable/limited ability to care for self    Primary Care Provided By spouse/significant other    Quality Of Family Relationships supportive;helpful    Able to Return to Prior Living Arrangements yes    Discharge Needs Assessment    Concerns To Be Addressed transportation;denies needs/concerns at this time    Readmission Within The Last 30 Days no previous admission in last 30 days    Anticipated Changes Related to Illness none    Equipment Currently Used at Home none    Equipment Needed After Discharge none    Discharge Disposition still a patient            Discharge Plan       12/14/17 1126    Case Management/Social Work Plan    Plan Home denies dc needs    Patient/Family In Agreement With Plan yes    Additional Comments CCP spoke with patient and wife Cady Giron ([phone]) introduced self and explained CCP role. Verified facesheet, Pt denies PCP and states he is looking in his area. Pt was agreeable for CCP to provide PCP [facility] appointment liasion sheet and he will think about it. Pt states he was independent with all ADL's prior to hospitalization  and remains so. CCP discussed Excercise oximetry and explained according to Triny Corbett pt does not qualify for oxygen for medicare to pay. CCP encouraged follow up with MD. Pt denies any HH or SNF. Pt states plan is  home at dc with wife, and requests Taxi voucher as his car was left on [address] when ambulance took them to hospital. CCP provided taxi voucher to RN. k.Ruby rn/ccp        Discharge Placement     No information found        Expected Discharge Date and Time     Expected Discharge Date Expected Discharge Time    Dec 14, 2017               Demographic Summary       12/14/17 1133    Referral Information    Arrived From admitted as an inpatient    Referral Source admission list    Reason For Consult discharge planning    Record Reviewed medical record    Contact Information    Permission Granted to Share Information With family/designee    Primary Care Physician Information    Name NO PCP            Functional Status       12/14/17 1133    Functional Status Current    Ambulation 0-->independent    Transferring 0-->independent    Toileting 0-->independent    Bathing 0-->independent    Dressing 0-->independent    Eating 0-->independent    Communication 0-->understands/communicates without difficulty    Swallowing (if score 2 or more for any item, consult Rehab Services) 0-->swallows foods/liquids without difficulty    Change in Functional Status Since Onset of Current Illness/Injury no    Functional Status Prior    Ambulation 0-->independent    Transferring 0-->independent    Toileting 0-->independent    Bathing 0-->independent    Dressing 0-->independent    Eating 0-->independent    Communication 0-->understands/communicates without difficulty    Swallowing 0-->swallows foods/liquids without difficulty    IADL    Medications independent    Meal Preparation independent    Housekeeping independent    Laundry independent    Shopping independent    Oral Care independent    Activity Tolerance    Current Activity Limitations none    Usual Activity Tolerance good    Current Activity Tolerance moderate    Cognitive/Perceptual/Developmental    Current Mental Status/Cognitive Functioning no deficits noted    Recent Changes  in Mental Status/Cognitive Functioning no changes    Employment/Financial    Financial Concerns none            Psychosocial     None            Abuse/Neglect     None            Legal     None            Substance Abuse     None            Patient Forms       12/14/17 1136    Patient Forms    Provider Choice List Delivered    Delivered to Patient    Method of delivery In person          Guadalupe Dodd RN

## 2017-12-14 NOTE — PLAN OF CARE
Problem: Patient Care Overview (Adult)  Goal: Plan of Care Review  Outcome: Ongoing (interventions implemented as appropriate)    12/13/17 1409 12/14/17 0535   Coping/Psychosocial Response Interventions   Plan Of Care Reviewed With patient --    Patient Care Overview   Progress --  progress toward functional goals is gradual   Outcome Evaluation   Outcome Summary/Follow up Plan --  oxygen titrated down and trialed on room air; pt desats to mid 80's on room air but reaches high 90's with 1 - 4 liters of oxygen via NC; pt has moments of dyspnea while oxygen saturations remain high, but returns to casual rhythm and comfort with pain medication and sleep       Goal: Adult Individualization and Mutuality  Outcome: Ongoing (interventions implemented as appropriate)  Goal: Discharge Needs Assessment  Outcome: Ongoing (interventions implemented as appropriate)    Problem: COPD, Chronic Bronchitis/Emphysema (Adult)  Goal: Signs and Symptoms of Listed Potential Problems Will be Absent or Manageable (COPD, Chronic Bronchitis/Emphysema)  Outcome: Ongoing (interventions implemented as appropriate)    Problem: Pain, Chronic (Adult)  Goal: Identify Related Risk Factors and Signs and Symptoms  Outcome: Ongoing (interventions implemented as appropriate)  Goal: Acceptable Pain Control/Comfort Level  Outcome: Ongoing (interventions implemented as appropriate)    Problem: Pressure Ulcer Risk (Ernie Scale) (Adult,Obstetrics,Pediatric)  Goal: Identify Related Risk Factors and Signs and Symptoms  Outcome: Ongoing (interventions implemented as appropriate)  Goal: Skin Integrity  Outcome: Ongoing (interventions implemented as appropriate)    Problem: Fall Risk (Adult)  Goal: Identify Related Risk Factors and Signs and Symptoms  Outcome: Ongoing (interventions implemented as appropriate)  Goal: Absence of Falls  Outcome: Ongoing (interventions implemented as appropriate)    Problem: Infection, Risk/Actual (Adult)  Goal: Identify Related  Risk Factors and Signs and Symptoms  Outcome: Ongoing (interventions implemented as appropriate)  Goal: Infection Prevention/Resolution  Outcome: Ongoing (interventions implemented as appropriate)    Problem: Skin Integrity Impairment, Risk/Actual (Adult)  Goal: Identify Related Risk Factors and Signs and Symptoms  Outcome: Ongoing (interventions implemented as appropriate)  Goal: Skin Integrity/Wound Healing  Outcome: Ongoing (interventions implemented as appropriate)    Problem: Pneumonia (Adult)  Goal: Signs and Symptoms of Listed Potential Problems Will be Absent or Manageable (Pneumonia)  Outcome: Ongoing (interventions implemented as appropriate)

## 2017-12-14 NOTE — PROGRESS NOTES
Exercise Oximetry    Patient Name:Philippe Giron   MRN: [id number]   Date: 12/14/17             ROOM AIR BASELINE   SpO2% 92   Heart Rate 78   Blood Pressure 122/75     EXERCISE ON ROOM AIR SpO2% EXERCISE ON O2 @  LPM SpO2%   1 MINUTE 89 1 MINUTE    2 MINUTES 89 2 MINUTES    3 MINUTES 92 3 MINUTES    4 MINUTES 90 4 MINUTES    5 MINUTES 89 5 MINUTES    6 MINUTES 90 6 MINUTES               Distance Walked   Distance Walked   Dyspnea (Yamileth Scale)   Dyspnea (Yamileth Scale)   Fatigue (Yamileth Scale)   Fatigue (Yamileth Scale)   SpO2% Post Exercise   SpO2% Post Exercise   HR Post Exercise   HR Post Exercise   Time to Recovery   Time to Recovery     Comments: patient walked 4 laps around nurses station, without any assistance at own pace, patient did stop to rest after 3 minutes of walking, patient complained of legs feeling weak

## 2017-12-15 NOTE — PROGRESS NOTES
Case Management Discharge Note    Final Note: Home no additional dc orders noted. Faiza RN/CCP    Discharge Placement     No information found             Discharge Codes: 01  Discharge to home

## 2017-12-18 LAB
BACTERIA SPEC AEROBE CULT: NORMAL
BACTERIA SPEC AEROBE CULT: NORMAL

## 2023-04-20 NOTE — ED NOTES
RT, Yusuf, states pt wants BIPAP off and to leave.  Dr Harper aware. BIPAP discontinued and pt on 1L NC.        Yoselyn Peoples RN  03/14/17 8691     HTN (hypertension)